# Patient Record
Sex: MALE | Race: BLACK OR AFRICAN AMERICAN | NOT HISPANIC OR LATINO | Employment: UNEMPLOYED | URBAN - NONMETROPOLITAN AREA
[De-identification: names, ages, dates, MRNs, and addresses within clinical notes are randomized per-mention and may not be internally consistent; named-entity substitution may affect disease eponyms.]

---

## 2018-08-25 ENCOUNTER — HOSPITAL ENCOUNTER (EMERGENCY)
Facility: HOSPITAL | Age: 23
Discharge: HOME | End: 2018-08-25
Attending: EMERGENCY MEDICINE | Admitting: EMERGENCY MEDICINE

## 2018-08-25 VITALS
BODY MASS INDEX: 27.32 KG/M2 | DIASTOLIC BLOOD PRESSURE: 77 MMHG | OXYGEN SATURATION: 100 % | HEIGHT: 66 IN | TEMPERATURE: 98.8 F | WEIGHT: 170 LBS | HEART RATE: 83 BPM | RESPIRATION RATE: 14 BRPM | SYSTOLIC BLOOD PRESSURE: 118 MMHG

## 2018-08-25 DIAGNOSIS — T67.1XXA HEAT SYNCOPE, INITIAL ENCOUNTER: Primary | ICD-10-CM

## 2018-08-25 LAB
ALBUMIN SERPL-MCNC: 4.5 G/DL (ref 3.5–5)
ALBUMIN/GLOB SERPL: 1.5 G/DL (ref 1.1–2.5)
ALP SERPL-CCNC: 61 U/L (ref 24–120)
ALT SERPL W P-5'-P-CCNC: 27 U/L (ref 0–54)
ANION GAP SERPL CALCULATED.3IONS-SCNC: 11 MMOL/L (ref 4–13)
AST SERPL-CCNC: 35 U/L (ref 7–45)
BASOPHILS # BLD AUTO: 0.08 10*3/MM3 (ref 0–0.2)
BASOPHILS NFR BLD AUTO: 0.7 % (ref 0–2)
BILIRUB SERPL-MCNC: 0.6 MG/DL (ref 0.1–1)
BUN BLD-MCNC: 11 MG/DL (ref 5–21)
BUN/CREAT SERPL: 15.3 (ref 7–25)
CALCIUM SPEC-SCNC: 9.3 MG/DL (ref 8.4–10.4)
CHLORIDE SERPL-SCNC: 107 MMOL/L (ref 98–110)
CO2 SERPL-SCNC: 23 MMOL/L (ref 24–31)
CREAT BLD-MCNC: 0.72 MG/DL (ref 0.5–1.4)
DEPRECATED RDW RBC AUTO: 35.4 FL (ref 40–54)
EOSINOPHIL # BLD AUTO: 0.07 10*3/MM3 (ref 0–0.7)
EOSINOPHIL NFR BLD AUTO: 0.6 % (ref 0–4)
ERYTHROCYTE [DISTWIDTH] IN BLOOD BY AUTOMATED COUNT: 11.7 % (ref 12–15)
GFR SERPL CREATININE-BSD FRML MDRD: >150 ML/MIN/1.73
GLOBULIN UR ELPH-MCNC: 3 GM/DL
GLUCOSE BLD-MCNC: 98 MG/DL (ref 70–100)
HCT VFR BLD AUTO: 46.3 % (ref 40–52)
HGB BLD-MCNC: 16.2 G/DL (ref 14–18)
HOLD SPECIMEN: NORMAL
HOLD SPECIMEN: NORMAL
IMM GRANULOCYTES # BLD: 0.05 10*3/MM3 (ref 0–0.03)
IMM GRANULOCYTES NFR BLD: 0.4 % (ref 0–5)
LYMPHOCYTES # BLD AUTO: 2.03 10*3/MM3 (ref 0.72–4.86)
LYMPHOCYTES NFR BLD AUTO: 17.5 % (ref 15–45)
MCH RBC QN AUTO: 29 PG (ref 28–32)
MCHC RBC AUTO-ENTMCNC: 35 G/DL (ref 33–36)
MCV RBC AUTO: 83 FL (ref 82–95)
MONOCYTES # BLD AUTO: 0.77 10*3/MM3 (ref 0.19–1.3)
MONOCYTES NFR BLD AUTO: 6.6 % (ref 4–12)
NEUTROPHILS # BLD AUTO: 8.62 10*3/MM3 (ref 1.87–8.4)
NEUTROPHILS NFR BLD AUTO: 74.2 % (ref 39–78)
NRBC BLD MANUAL-RTO: 0 /100 WBC (ref 0–0)
PLATELET # BLD AUTO: 288 10*3/MM3 (ref 130–400)
PMV BLD AUTO: 9.2 FL (ref 6–12)
POTASSIUM BLD-SCNC: 4.1 MMOL/L (ref 3.5–5.3)
PROT SERPL-MCNC: 7.5 G/DL (ref 6.3–8.7)
RBC # BLD AUTO: 5.58 10*6/MM3 (ref 4.8–5.9)
SODIUM BLD-SCNC: 141 MMOL/L (ref 135–145)
WBC NRBC COR # BLD: 11.62 10*3/MM3 (ref 4.8–10.8)
WHOLE BLOOD HOLD SPECIMEN: NORMAL
WHOLE BLOOD HOLD SPECIMEN: NORMAL

## 2018-08-25 PROCEDURE — 93010 ELECTROCARDIOGRAM REPORT: CPT | Performed by: INTERNAL MEDICINE

## 2018-08-25 PROCEDURE — 85025 COMPLETE CBC W/AUTO DIFF WBC: CPT | Performed by: EMERGENCY MEDICINE

## 2018-08-25 PROCEDURE — 36415 COLL VENOUS BLD VENIPUNCTURE: CPT

## 2018-08-25 PROCEDURE — 80053 COMPREHEN METABOLIC PANEL: CPT | Performed by: EMERGENCY MEDICINE

## 2018-08-25 PROCEDURE — 99284 EMERGENCY DEPT VISIT MOD MDM: CPT

## 2018-08-25 PROCEDURE — 93005 ELECTROCARDIOGRAM TRACING: CPT | Performed by: EMERGENCY MEDICINE

## 2018-08-25 RX ORDER — ACETAMINOPHEN 500 MG
1000 TABLET ORAL ONCE
Status: COMPLETED | OUTPATIENT
Start: 2018-08-25 | End: 2018-08-25

## 2018-08-25 RX ADMIN — SODIUM CHLORIDE 1000 ML: 9 INJECTION, SOLUTION INTRAVENOUS at 18:23

## 2018-08-25 RX ADMIN — ACETAMINOPHEN 1000 MG: 500 TABLET, FILM COATED ORAL at 18:25

## 2024-09-23 ENCOUNTER — APPOINTMENT (OUTPATIENT)
Dept: GENERAL RADIOLOGY | Age: 29
End: 2024-09-23
Payer: MEDICAID

## 2024-09-23 ENCOUNTER — HOSPITAL ENCOUNTER (EMERGENCY)
Age: 29
Discharge: HOME | End: 2024-09-23
Payer: MEDICAID

## 2024-09-23 VITALS
TEMPERATURE: 97.2 F | DIASTOLIC BLOOD PRESSURE: 66 MMHG | OXYGEN SATURATION: 100 % | HEART RATE: 73 BPM | RESPIRATION RATE: 13 BRPM | SYSTOLIC BLOOD PRESSURE: 112 MMHG | WEIGHT: 160 LBS

## 2024-09-23 DIAGNOSIS — F41.1 ANXIETY STATE: ICD-10-CM

## 2024-09-23 DIAGNOSIS — R07.89 OTHER CHEST PAIN: Primary | ICD-10-CM

## 2024-09-23 LAB
ALBUMIN SERPL-MCNC: 4.4 G/DL (ref 3.5–5.2)
ALP SERPL-CCNC: 61 U/L (ref 40–129)
ALT SERPL-CCNC: 13 U/L (ref 5–41)
ANION GAP SERPL CALCULATED.3IONS-SCNC: 10 MMOL/L (ref 7–19)
AST SERPL-CCNC: 19 U/L (ref 5–40)
BASOPHILS # BLD: 0.1 K/UL (ref 0–0.2)
BASOPHILS NFR BLD: 0.8 % (ref 0–1)
BILIRUB SERPL-MCNC: 0.5 MG/DL (ref 0.2–1.2)
BNP BLD-MCNC: <36 PG/ML (ref 0–124)
BUN SERPL-MCNC: 21 MG/DL (ref 6–20)
CALCIUM SERPL-MCNC: 9.1 MG/DL (ref 8.6–10)
CHLORIDE SERPL-SCNC: 102 MMOL/L (ref 98–111)
CO2 SERPL-SCNC: 26 MMOL/L (ref 22–29)
CREAT SERPL-MCNC: 0.7 MG/DL (ref 0.7–1.2)
D DIMER PPP FEU-MCNC: <0.27 UG/ML FEU (ref 0–0.48)
EOSINOPHIL # BLD: 0.2 K/UL (ref 0–0.6)
EOSINOPHIL NFR BLD: 2.3 % (ref 0–5)
ERYTHROCYTE [DISTWIDTH] IN BLOOD BY AUTOMATED COUNT: 12.7 % (ref 11.5–14.5)
GLUCOSE SERPL-MCNC: 95 MG/DL (ref 70–99)
HCT VFR BLD AUTO: 46.7 % (ref 42–52)
HGB BLD-MCNC: 15.4 G/DL (ref 14–18)
IMM GRANULOCYTES # BLD: 0 K/UL
LYMPHOCYTES # BLD: 2.8 K/UL (ref 1.1–4.5)
LYMPHOCYTES NFR BLD: 33 % (ref 20–40)
MCH RBC QN AUTO: 29.6 PG (ref 27–31)
MCHC RBC AUTO-ENTMCNC: 33 G/DL (ref 33–37)
MCV RBC AUTO: 89.8 FL (ref 80–94)
MONOCYTES # BLD: 0.8 K/UL (ref 0–0.9)
MONOCYTES NFR BLD: 8.9 % (ref 0–10)
NEUTROPHILS # BLD: 4.6 K/UL (ref 1.5–7.5)
NEUTS SEG NFR BLD: 54.9 % (ref 50–65)
PLATELET # BLD AUTO: 257 K/UL (ref 130–400)
PMV BLD AUTO: 9 FL (ref 9.4–12.4)
POTASSIUM SERPL-SCNC: 4.4 MMOL/L (ref 3.5–5)
PROT SERPL-MCNC: 7.4 G/DL (ref 6.4–8.3)
RBC # BLD AUTO: 5.2 M/UL (ref 4.7–6.1)
SODIUM SERPL-SCNC: 138 MMOL/L (ref 136–145)
TROPONIN, HIGH SENSITIVITY: <6 NG/L (ref 0–22)
WBC # BLD AUTO: 8.4 K/UL (ref 4.8–10.8)

## 2024-09-23 PROCEDURE — 93005 ELECTROCARDIOGRAM TRACING: CPT | Performed by: NURSE PRACTITIONER

## 2024-09-23 PROCEDURE — 71045 X-RAY EXAM CHEST 1 VIEW: CPT

## 2024-09-23 PROCEDURE — 85025 COMPLETE CBC W/AUTO DIFF WBC: CPT

## 2024-09-23 PROCEDURE — 36415 COLL VENOUS BLD VENIPUNCTURE: CPT

## 2024-09-23 PROCEDURE — 6370000000 HC RX 637 (ALT 250 FOR IP): Performed by: NURSE PRACTITIONER

## 2024-09-23 PROCEDURE — 84484 ASSAY OF TROPONIN QUANT: CPT

## 2024-09-23 PROCEDURE — 83880 ASSAY OF NATRIURETIC PEPTIDE: CPT

## 2024-09-23 PROCEDURE — 80053 COMPREHEN METABOLIC PANEL: CPT

## 2024-09-23 PROCEDURE — 99285 EMERGENCY DEPT VISIT HI MDM: CPT

## 2024-09-23 PROCEDURE — 85379 FIBRIN DEGRADATION QUANT: CPT

## 2024-09-23 RX ORDER — LEVETIRACETAM 500 MG/1
750 TABLET ORAL ONCE
Status: COMPLETED | OUTPATIENT
Start: 2024-09-23 | End: 2024-09-23

## 2024-09-23 RX ADMIN — LEVETIRACETAM 750 MG: 500 TABLET, FILM COATED ORAL at 19:46

## 2024-09-24 LAB
EKG P AXIS: -52 DEGREES
EKG P-R INTERVAL: 120 MS
EKG Q-T INTERVAL: 340 MS
EKG QRS DURATION: 88 MS
EKG QTC CALCULATION (BAZETT): 362 MS
EKG T AXIS: 75 DEGREES

## 2024-09-27 ENCOUNTER — APPOINTMENT (OUTPATIENT)
Dept: CT IMAGING | Facility: HOSPITAL | Age: 29
End: 2024-09-27
Payer: MEDICAID

## 2024-09-27 ENCOUNTER — APPOINTMENT (OUTPATIENT)
Dept: GENERAL RADIOLOGY | Facility: HOSPITAL | Age: 29
End: 2024-09-27
Payer: MEDICAID

## 2024-09-27 ENCOUNTER — HOSPITAL ENCOUNTER (EMERGENCY)
Facility: HOSPITAL | Age: 29
Discharge: HOME | End: 2024-09-27
Attending: EMERGENCY MEDICINE
Payer: MEDICAID

## 2024-09-27 VITALS
WEIGHT: 190 LBS | OXYGEN SATURATION: 99 % | HEART RATE: 94 BPM | RESPIRATION RATE: 20 BRPM | HEIGHT: 72 IN | DIASTOLIC BLOOD PRESSURE: 78 MMHG | SYSTOLIC BLOOD PRESSURE: 135 MMHG | TEMPERATURE: 97.9 F | BODY MASS INDEX: 25.73 KG/M2

## 2024-09-27 DIAGNOSIS — R56.9 SEIZURE: Primary | ICD-10-CM

## 2024-09-27 DIAGNOSIS — Z91.199 NONCOMPLIANCE: ICD-10-CM

## 2024-09-27 LAB
ALBUMIN SERPL-MCNC: 4.4 G/DL (ref 3.5–5.2)
ALBUMIN/GLOB SERPL: 1.6 G/DL
ALP SERPL-CCNC: 68 U/L (ref 39–117)
ALT SERPL W P-5'-P-CCNC: 15 U/L (ref 1–41)
AMPHET+METHAMPHET UR QL: NEGATIVE
AMPHETAMINES UR QL: NEGATIVE
ANION GAP SERPL CALCULATED.3IONS-SCNC: 10 MMOL/L (ref 5–15)
AST SERPL-CCNC: 20 U/L (ref 1–40)
BARBITURATES UR QL SCN: NEGATIVE
BASOPHILS # BLD AUTO: 0.05 10*3/MM3 (ref 0–0.2)
BASOPHILS NFR BLD AUTO: 0.7 % (ref 0–1.5)
BENZODIAZ UR QL SCN: NEGATIVE
BILIRUB SERPL-MCNC: 0.2 MG/DL (ref 0–1.2)
BILIRUB UR QL STRIP: NEGATIVE
BUN SERPL-MCNC: 17 MG/DL (ref 6–20)
BUN/CREAT SERPL: 23.9 (ref 7–25)
BUPRENORPHINE SERPL-MCNC: NEGATIVE NG/ML
CALCIUM SPEC-SCNC: 9.5 MG/DL (ref 8.6–10.5)
CANNABINOIDS SERPL QL: NEGATIVE
CHLORIDE SERPL-SCNC: 103 MMOL/L (ref 98–107)
CLARITY UR: ABNORMAL
CO2 SERPL-SCNC: 29 MMOL/L (ref 22–29)
COCAINE UR QL: NEGATIVE
COLOR UR: YELLOW
CREAT SERPL-MCNC: 0.71 MG/DL (ref 0.76–1.27)
DEPRECATED RDW RBC AUTO: 41.8 FL (ref 37–54)
EGFRCR SERPLBLD CKD-EPI 2021: 128.2 ML/MIN/1.73
EOSINOPHIL # BLD AUTO: 0.17 10*3/MM3 (ref 0–0.4)
EOSINOPHIL NFR BLD AUTO: 2.3 % (ref 0.3–6.2)
ERYTHROCYTE [DISTWIDTH] IN BLOOD BY AUTOMATED COUNT: 12.9 % (ref 12.3–15.4)
FENTANYL UR-MCNC: NEGATIVE NG/ML
GLOBULIN UR ELPH-MCNC: 2.7 GM/DL
GLUCOSE SERPL-MCNC: 87 MG/DL (ref 65–99)
GLUCOSE UR STRIP-MCNC: NEGATIVE MG/DL
HCT VFR BLD AUTO: 43.9 % (ref 37.5–51)
HGB BLD-MCNC: 14.9 G/DL (ref 13–17.7)
HGB UR QL STRIP.AUTO: NEGATIVE
IMM GRANULOCYTES # BLD AUTO: 0.01 10*3/MM3 (ref 0–0.05)
IMM GRANULOCYTES NFR BLD AUTO: 0.1 % (ref 0–0.5)
KETONES UR QL STRIP: NEGATIVE
LEUKOCYTE ESTERASE UR QL STRIP.AUTO: NEGATIVE
LYMPHOCYTES # BLD AUTO: 2.5 10*3/MM3 (ref 0.7–3.1)
LYMPHOCYTES NFR BLD AUTO: 34.5 % (ref 19.6–45.3)
MCH RBC QN AUTO: 30.3 PG (ref 26.6–33)
MCHC RBC AUTO-ENTMCNC: 33.9 G/DL (ref 31.5–35.7)
MCV RBC AUTO: 89.4 FL (ref 79–97)
METHADONE UR QL SCN: NEGATIVE
MONOCYTES # BLD AUTO: 0.59 10*3/MM3 (ref 0.1–0.9)
MONOCYTES NFR BLD AUTO: 8.1 % (ref 5–12)
NEUTROPHILS NFR BLD AUTO: 3.92 10*3/MM3 (ref 1.7–7)
NEUTROPHILS NFR BLD AUTO: 54.3 % (ref 42.7–76)
NITRITE UR QL STRIP: NEGATIVE
NRBC BLD AUTO-RTO: 0 /100 WBC (ref 0–0.2)
OPIATES UR QL: NEGATIVE
OXYCODONE UR QL SCN: NEGATIVE
PCP UR QL SCN: NEGATIVE
PH UR STRIP.AUTO: 8.5 [PH] (ref 5–8)
PLATELET # BLD AUTO: 255 10*3/MM3 (ref 140–450)
PMV BLD AUTO: 9.1 FL (ref 6–12)
POTASSIUM SERPL-SCNC: 4.7 MMOL/L (ref 3.5–5.2)
PROT SERPL-MCNC: 7.1 G/DL (ref 6–8.5)
PROT UR QL STRIP: NEGATIVE
RBC # BLD AUTO: 4.91 10*6/MM3 (ref 4.14–5.8)
SODIUM SERPL-SCNC: 142 MMOL/L (ref 136–145)
SP GR UR STRIP: 1.02 (ref 1–1.03)
TRICYCLICS UR QL SCN: NEGATIVE
UROBILINOGEN UR QL STRIP: ABNORMAL
WBC NRBC COR # BLD AUTO: 7.24 10*3/MM3 (ref 3.4–10.8)

## 2024-09-27 PROCEDURE — 25810000003 SODIUM CHLORIDE 0.9 % SOLUTION: Performed by: EMERGENCY MEDICINE

## 2024-09-27 PROCEDURE — 25010000002 LEVETRIRACETAM PER 10 MG: Performed by: EMERGENCY MEDICINE

## 2024-09-27 PROCEDURE — 71045 X-RAY EXAM CHEST 1 VIEW: CPT

## 2024-09-27 PROCEDURE — 85025 COMPLETE CBC W/AUTO DIFF WBC: CPT | Performed by: EMERGENCY MEDICINE

## 2024-09-27 PROCEDURE — 99284 EMERGENCY DEPT VISIT MOD MDM: CPT

## 2024-09-27 PROCEDURE — 93010 ELECTROCARDIOGRAM REPORT: CPT | Performed by: INTERNAL MEDICINE

## 2024-09-27 PROCEDURE — 81003 URINALYSIS AUTO W/O SCOPE: CPT | Performed by: EMERGENCY MEDICINE

## 2024-09-27 PROCEDURE — 70450 CT HEAD/BRAIN W/O DYE: CPT

## 2024-09-27 PROCEDURE — 72125 CT NECK SPINE W/O DYE: CPT

## 2024-09-27 PROCEDURE — 80053 COMPREHEN METABOLIC PANEL: CPT | Performed by: EMERGENCY MEDICINE

## 2024-09-27 PROCEDURE — 80307 DRUG TEST PRSMV CHEM ANLYZR: CPT | Performed by: EMERGENCY MEDICINE

## 2024-09-27 PROCEDURE — 93005 ELECTROCARDIOGRAM TRACING: CPT | Performed by: EMERGENCY MEDICINE

## 2024-09-27 PROCEDURE — 96374 THER/PROPH/DIAG INJ IV PUSH: CPT

## 2024-09-27 RX ORDER — LEVETIRACETAM 750 MG/1
750 TABLET ORAL EVERY 12 HOURS SCHEDULED
COMMUNITY
Start: 2024-09-23

## 2024-09-27 RX ORDER — IBUPROFEN 800 MG/1
TABLET, FILM COATED ORAL
Status: ON HOLD | COMMUNITY
Start: 2024-09-27 | End: 2024-09-28

## 2024-09-27 RX ORDER — AMMONIUM LACTATE 12 G/100G
LOTION TOPICAL
Status: ON HOLD | COMMUNITY
Start: 2024-09-27 | End: 2024-09-28

## 2024-09-27 RX ORDER — LEVETIRACETAM 500 MG/1
750 TABLET ORAL 2 TIMES DAILY
Qty: 90 TABLET | Refills: 0 | Status: ON HOLD | OUTPATIENT
Start: 2024-09-27 | End: 2024-09-28

## 2024-09-27 RX ORDER — SODIUM CHLORIDE 0.9 % (FLUSH) 0.9 %
10 SYRINGE (ML) INJECTION AS NEEDED
Status: DISCONTINUED | OUTPATIENT
Start: 2024-09-27 | End: 2024-09-27 | Stop reason: HOSPADM

## 2024-09-27 RX ORDER — LEVETIRACETAM 500 MG/5ML
1000 INJECTION, SOLUTION, CONCENTRATE INTRAVENOUS ONCE
Status: COMPLETED | OUTPATIENT
Start: 2024-09-27 | End: 2024-09-27

## 2024-09-27 RX ORDER — BUSPIRONE HYDROCHLORIDE 5 MG/1
TABLET ORAL
Status: ON HOLD | COMMUNITY
Start: 2024-09-25 | End: 2024-09-28

## 2024-09-27 RX ORDER — LEVETIRACETAM 500 MG/1
500 TABLET ORAL 2 TIMES DAILY
Qty: 60 TABLET | Refills: 0 | Status: SHIPPED | OUTPATIENT
Start: 2024-09-27 | End: 2024-09-27

## 2024-09-27 RX ADMIN — SODIUM CHLORIDE 500 ML: 9 INJECTION, SOLUTION INTRAVENOUS at 17:43

## 2024-09-27 RX ADMIN — LEVETIRACETAM 1000 MG: 100 INJECTION INTRAVENOUS at 17:25

## 2024-09-27 NOTE — DISCHARGE INSTRUCTIONS
It was very nice to meet you, Get. Thank you for allowing us to take care of you today at [facility].     Today you were seen in the emergency department for your symptoms. Please understand that an ER evaluation is just the start of your evaluation. We do the best we can, but we are often unable to fully find what is causing your symptoms from one evaluation.  Because of this, the goal is to determine whether you need to be evaluated in the hospital or if it is safe for you to go home and see other doctors provided such as primary care physicians or specialist on an outpatient basis.      Like we discussed, I strongly urge that you follow up with your primary care doctor. Please call their office to set up an appointment as soon as possible so that you can be re-evaluated for improvement in your symptoms or for any other questions.  I have provided the information needed, including phone number, to call to set up an appointment below in these discharge papers.      Educational material has also been provided in the following pages regarding what we have discussed today.      Please return to the emergency room within 12-48 hours if you experience symptoms such as the following:   Fever, chills, chest pain or shortness of breath, pain with inspiration/expiration, pain that travels to your arms, neck or back, nausea, vomiting, severe headache, tearing pain in your chest, dizziness, feel as though you are about to pass out, OR if you have any worsening symptoms, or any other concerns.      No driving or operating machinery until released.  No climbing ladders, work at heights or swimming until released.       Recommended to observe all seizure precautions, including, but not limited to   No driving until seizure free for more than 3 months- as per State driving regulation / law;   Avoid all high-risk activity,   Take showers instead of baths,   Avoid swimming without observation,   Avoid open heat  sources,   Avoid working at heights and   Avoid engaging in all potentially hazardous activities.

## 2024-09-27 NOTE — ED PROVIDER NOTES
Subjective   History of Present Illness    Review of Systems    Past Medical History:   Diagnosis Date    Hyperlipidemia     Seizures        No Known Allergies    Past Surgical History:   Procedure Laterality Date    NO PAST SURGERIES         History reviewed. No pertinent family history.    Social History     Socioeconomic History    Marital status: Single   Tobacco Use    Smoking status: Heavy Smoker     Current packs/day: 2.00     Types: Cigarettes   Substance and Sexual Activity    Alcohol use: No    Drug use: No           Objective   Physical Exam    Procedures           ED Course  ED Course as of 09/27/24 1839   Fri Sep 27, 2024   1734 Taken over from Dr. Starr pending lab reports [TS]   1836 Please refer to Dr. Starr reports further details the patient came in after having a seizure he has been off of seizure medications.  CT of the head and CT of the cervical spine and chest x-ray are negative. [TS]   1836 Examination the patient at this time reviewed the patient's Jossie Coma Scale of 15 of 15.  Scalp is atraumatic.  Pupils are PERRL.  Neck is supple.  Chest auscultation bilateral good breath sounds.  Abdomen soft nontender.  CNS times nonfocal cranial suture within normal limits of motor system examination unremarkable.  In the ED patient was given Keppra and will discharge him home on Keppra and have him follow the primary MD seizure precautions. [TS]   1839 Patient has not been taking his antiepileptics he states he does not have any therefore repeat prescription was called in for him. [TS]      ED Course User Index  [TS] Hesham Cohen MD                                             Medical Decision Making  Problems Addressed:  Noncompliance: complicated acute illness or injury  Seizure: complicated acute illness or injury    Amount and/or Complexity of Data Reviewed  Labs: ordered.  Radiology: ordered.  ECG/medicine tests: ordered.    Risk  Prescription drug management.        Final diagnoses:    Seizure   Noncompliance       ED Disposition  ED Disposition       ED Disposition   Discharge    Condition   Stable    Comment   --               Vianey Nancy N, APRN  [address] [facility] [facility] [address]  [address]  [facility] [address]  [phone]               Medication List        Changed      * levETIRAcetam 750 MG tablet  Commonly known as: KEPPRA  What changed: Another medication with the same name was added. Make sure you understand how and when to take each.     * levETIRAcetam 500 MG tablet  Commonly known as: KEPPRA  Take 1.5 tablets by mouth 2 (Two) Times a Day for 30 days.  What changed: You were already taking a medication with the same name, and this prescription was added. Make sure you understand how and when to take each.           * This list has 2 medication(s) that are the same as other medications prescribed for you. Read the directions carefully, and ask your doctor or other care provider to review them with you.                   Where to Get Your Medications        These medications were sent to [id number] - [place], [place] - [address] [place] [address] [phone] [facility] [phone] [address] [address]      Phone: [phone]   levETIRAcetam 500 MG tablet            Hesham Cohen MD  09/27/24 2179

## 2024-09-27 NOTE — ED PROVIDER NOTES
Subjective   History of Present Illness  Patient is a 29-year-old male with a history of epilepsy who presents to the ER status post seizure.  Patient is currently at [employer] and has been sober for the last 7 months.  Patient states he has not had his Keppra in the last 2 weeks.  Today the patient was found lying in the floor with tonic-clonic like movement.  They state he had intermittent shaking for approximately 15 minutes.  EMS was called and patient was found to be postictal.  Patient has improved in route.  Patient complains of a headache and chest pain.  He denies any fever, shortness of air, abdominal pain, nausea vomiting diarrhea, urinary changes, numbness or weakness, neck or back pain, extremity pain.      Review of Systems   Constitutional: Negative.    Eyes: Negative.    Respiratory: Negative.     Cardiovascular:  Positive for chest pain.   Gastrointestinal: Negative.    Endocrine: Negative.    Genitourinary: Negative.    Musculoskeletal: Negative.    Skin: Negative.    Allergic/Immunologic: Negative.    Neurological:  Positive for seizures and headaches.   Hematological: Negative.    Psychiatric/Behavioral: Negative.     All other systems reviewed and are negative.      Past Medical History:   Diagnosis Date    Hyperlipidemia     Seizures        No Known Allergies    Past Surgical History:   Procedure Laterality Date    NO PAST SURGERIES         History reviewed. No pertinent family history.    Social History     Socioeconomic History    Marital status: Single   Tobacco Use    Smoking status: Heavy Smoker     Current packs/day: 1.00     Types: Cigarettes   Vaping Use    Vaping status: Every Day    Substances: Nicotine    Passive vaping exposure: Yes   Substance and Sexual Activity    Alcohol use: No    Drug use: Not Currently     Types: Methamphetamines    Sexual activity: Defer           Objective   Physical Exam  Vitals and nursing note reviewed.   Constitutional:       Appearance: He is  well-developed.   HENT:      Head: Normocephalic and atraumatic.   Eyes:      Conjunctiva/sclera: Conjunctivae normal.      Pupils: Pupils are equal, round, and reactive to light.   Cardiovascular:      Rate and Rhythm: Normal rate and regular rhythm.      Heart sounds: Normal heart sounds.   Pulmonary:      Effort: Pulmonary effort is normal.      Breath sounds: Normal breath sounds.   Chest:      Comments: Tender to palpation midsternal chest wall  Abdominal:      Palpations: Abdomen is soft.      Tenderness: There is no abdominal tenderness.   Musculoskeletal:         General: No deformity. Normal range of motion.      Cervical back: Normal range of motion.   Skin:     General: Skin is warm.      Comments: Blister to the left heel   Neurological:      Mental Status: He is alert and oriented to person, place, and time.      Cranial Nerves: Cranial nerves 2-12 are intact.      Sensory: Sensation is intact.      Motor: Motor function is intact.   Psychiatric:         Behavior: Behavior normal.         Procedures           ED Course  ED Course as of 09/30/24 0644   Fri Sep 27, 2024   1734 Taken over from Dr. Starr pending lab reports [TS]   1836 Please refer to Dr. Starr reports further details the patient came in after having a seizure he has been off of seizure medications.  CT of the head and CT of the cervical spine and chest x-ray are negative. [TS]   1836 Examination the patient at this time reviewed the patient's Jossie Coma Scale of 15 of 15.  Scalp is atraumatic.  Pupils are PERRL.  Neck is supple.  Chest auscultation bilateral good breath sounds.  Abdomen soft nontender.  CNS times nonfocal cranial suture within normal limits of motor system examination unremarkable.  In the ED patient was given Keppra and will discharge him home on Keppra and have him follow the primary MD seizure precautions. [TS]   1839 Patient has not been taking his antiepileptics he states he does not have any therefore repeat  prescription was called in for him. [TS]      ED Course User Index  [TS] Hesham Cohen MD      Lab Results (last 24 hours)       ** No results found for the last 24 hours. **           XR Chest 1 View   Final Result   Impression: No evidence of acute cardiopulmonary disease.       This report was signed and finalized on 9/27/2024 5:59 PM by Dr. Mitch Leger MD.          CT Head Without Contrast   Final Result   1. No acute intracranial process.               This report was signed and finalized on 9/27/2024 5:55 PM by Dr. Mitch Leger MD.          CT Cervical Spine Without Contrast   Final Result   1. No evidence of acute osseous injury or malalignment in the cervical   spine.               This report was signed and finalized on 9/27/2024 5:59 PM by Dr. Mitch Leger MD.                                                  Medical Decision Making  Patient is a 29-year-old male with a history of epilepsy who presents to the ER status post seizure.  Patient is currently at [employer] and has been sober for the last 7 months.  Patient states he has not had his Keppra in the last 2 weeks.  Today the patient was found lying in the floor with tonic-clonic like movement.  They state he had intermittent shaking for approximately 15 minutes.  EMS was called and patient was found to be postictal.  Patient has improved in route.  Patient complains of a headache and chest pain.  He denies any fever, shortness of air, abdominal pain, nausea vomiting diarrhea, urinary changes, numbness or weakness, neck or back pain, extremity pain.    Differential diagnosis: Seizure, syncope    Patient was given IV fluids and loaded with Keppra.  Labs and imaging were ordered and results pending at shift change.  Care transferred to Dr. Cohen.    Problems Addressed:  Noncompliance: complicated acute illness or injury  Seizure: complicated acute illness or injury    Amount and/or Complexity of Data Reviewed  Labs:  ordered.  Radiology: ordered.  ECG/medicine tests: ordered.    Risk  Prescription drug management.        Final diagnoses:   Seizure   Noncompliance       ED Disposition  ED Disposition       ED Disposition   Discharge    Condition   Stable    Comment   --               Nancy Winters, APRN  [address] [facility] [address]  [address]  [facility] [address]  [phone]               Medication List      No changes were made to your prescriptions during this visit.            Ramona Starr MD  09/27/24 1722       Ramona Starr MD  09/30/24 0637

## 2024-09-28 ENCOUNTER — APPOINTMENT (OUTPATIENT)
Dept: CT IMAGING | Facility: HOSPITAL | Age: 29
End: 2024-09-28
Payer: MEDICAID

## 2024-09-28 ENCOUNTER — APPOINTMENT (OUTPATIENT)
Dept: GENERAL RADIOLOGY | Facility: HOSPITAL | Age: 29
End: 2024-09-28
Payer: MEDICAID

## 2024-09-28 ENCOUNTER — APPOINTMENT (OUTPATIENT)
Dept: MRI IMAGING | Facility: HOSPITAL | Age: 29
End: 2024-09-28
Payer: MEDICAID

## 2024-09-28 ENCOUNTER — APPOINTMENT (OUTPATIENT)
Dept: NEUROLOGY | Facility: HOSPITAL | Age: 29
End: 2024-09-28
Payer: MEDICAID

## 2024-09-28 ENCOUNTER — HOSPITAL ENCOUNTER (OUTPATIENT)
Facility: HOSPITAL | Age: 29
Setting detail: OBSERVATION
Discharge: HOME | End: 2024-09-29
Attending: EMERGENCY MEDICINE | Admitting: INTERNAL MEDICINE
Payer: MEDICAID

## 2024-09-28 DIAGNOSIS — G40.909 SEIZURE DISORDER: Primary | ICD-10-CM

## 2024-09-28 PROBLEM — F19.11 HISTORY OF DRUG ABUSE: Status: ACTIVE | Noted: 2024-09-28

## 2024-09-28 PROBLEM — Z72.0 TOBACCO ABUSE: Status: ACTIVE | Noted: 2024-09-28

## 2024-09-28 PROBLEM — Z91.148 NONCOMPLIANCE WITH MEDICATION REGIMEN: Status: ACTIVE | Noted: 2024-09-28

## 2024-09-28 PROBLEM — R56.9 SEIZURE: Status: ACTIVE | Noted: 2024-09-28

## 2024-09-28 LAB
ALBUMIN SERPL-MCNC: 4 G/DL (ref 3.5–5.2)
ALBUMIN/GLOB SERPL: 1.6 G/DL
ALP SERPL-CCNC: 58 U/L (ref 39–117)
ALT SERPL W P-5'-P-CCNC: 15 U/L (ref 1–41)
AMPHET+METHAMPHET UR QL: NEGATIVE
AMPHETAMINES UR QL: NEGATIVE
ANION GAP SERPL CALCULATED.3IONS-SCNC: 8 MMOL/L (ref 5–15)
AST SERPL-CCNC: 20 U/L (ref 1–40)
BARBITURATES UR QL SCN: NEGATIVE
BASOPHILS # BLD AUTO: 0.03 10*3/MM3 (ref 0–0.2)
BASOPHILS NFR BLD AUTO: 0.4 % (ref 0–1.5)
BENZODIAZ UR QL SCN: NEGATIVE
BILIRUB SERPL-MCNC: 0.3 MG/DL (ref 0–1.2)
BUN SERPL-MCNC: 14 MG/DL (ref 6–20)
BUN/CREAT SERPL: 19.7 (ref 7–25)
BUPRENORPHINE SERPL-MCNC: NEGATIVE NG/ML
CALCIUM SPEC-SCNC: 8.8 MG/DL (ref 8.6–10.5)
CANNABINOIDS SERPL QL: NEGATIVE
CHLORIDE SERPL-SCNC: 107 MMOL/L (ref 98–107)
CK SERPL-CCNC: 85 U/L (ref 20–200)
CO2 SERPL-SCNC: 28 MMOL/L (ref 22–29)
COCAINE UR QL: NEGATIVE
CREAT SERPL-MCNC: 0.71 MG/DL (ref 0.76–1.27)
D-LACTATE SERPL-SCNC: 1.8 MMOL/L (ref 0.5–2)
DEPRECATED RDW RBC AUTO: 42.6 FL (ref 37–54)
EGFRCR SERPLBLD CKD-EPI 2021: 128.2 ML/MIN/1.73
EOSINOPHIL # BLD AUTO: 0.13 10*3/MM3 (ref 0–0.4)
EOSINOPHIL NFR BLD AUTO: 1.9 % (ref 0.3–6.2)
ERYTHROCYTE [DISTWIDTH] IN BLOOD BY AUTOMATED COUNT: 13 % (ref 12.3–15.4)
ETHANOL UR QL: <0.01 %
FENTANYL UR-MCNC: NEGATIVE NG/ML
GLOBULIN UR ELPH-MCNC: 2.5 GM/DL
GLUCOSE SERPL-MCNC: 99 MG/DL (ref 65–99)
HCT VFR BLD AUTO: 40.7 % (ref 37.5–51)
HGB BLD-MCNC: 13.5 G/DL (ref 13–17.7)
IMM GRANULOCYTES # BLD AUTO: 0.02 10*3/MM3 (ref 0–0.05)
IMM GRANULOCYTES NFR BLD AUTO: 0.3 % (ref 0–0.5)
LYMPHOCYTES # BLD AUTO: 2.1 10*3/MM3 (ref 0.7–3.1)
LYMPHOCYTES NFR BLD AUTO: 30.2 % (ref 19.6–45.3)
MAGNESIUM SERPL-MCNC: 1.9 MG/DL (ref 1.6–2.6)
MCH RBC QN AUTO: 29.9 PG (ref 26.6–33)
MCHC RBC AUTO-ENTMCNC: 33.2 G/DL (ref 31.5–35.7)
MCV RBC AUTO: 90 FL (ref 79–97)
METHADONE UR QL SCN: NEGATIVE
MONOCYTES # BLD AUTO: 0.58 10*3/MM3 (ref 0.1–0.9)
MONOCYTES NFR BLD AUTO: 8.3 % (ref 5–12)
NEUTROPHILS NFR BLD AUTO: 4.1 10*3/MM3 (ref 1.7–7)
NEUTROPHILS NFR BLD AUTO: 58.9 % (ref 42.7–76)
NRBC BLD AUTO-RTO: 0 /100 WBC (ref 0–0.2)
OPIATES UR QL: NEGATIVE
OXYCODONE UR QL SCN: NEGATIVE
PCP UR QL SCN: NEGATIVE
PLATELET # BLD AUTO: 232 10*3/MM3 (ref 140–450)
PMV BLD AUTO: 9.3 FL (ref 6–12)
POTASSIUM SERPL-SCNC: 4.5 MMOL/L (ref 3.5–5.2)
PROT SERPL-MCNC: 6.5 G/DL (ref 6–8.5)
QT INTERVAL: 322 MS
QT INTERVAL: 326 MS
QTC INTERVAL: 385 MS
QTC INTERVAL: 393 MS
RBC # BLD AUTO: 4.52 10*6/MM3 (ref 4.14–5.8)
SODIUM SERPL-SCNC: 143 MMOL/L (ref 136–145)
TRICYCLICS UR QL SCN: NEGATIVE
WBC NRBC COR # BLD AUTO: 6.96 10*3/MM3 (ref 3.4–10.8)

## 2024-09-28 PROCEDURE — 80307 DRUG TEST PRSMV CHEM ANLYZR: CPT | Performed by: EMERGENCY MEDICINE

## 2024-09-28 PROCEDURE — 85025 COMPLETE CBC W/AUTO DIFF WBC: CPT | Performed by: EMERGENCY MEDICINE

## 2024-09-28 PROCEDURE — G0378 HOSPITAL OBSERVATION PER HR: HCPCS

## 2024-09-28 PROCEDURE — 93005 ELECTROCARDIOGRAM TRACING: CPT | Performed by: EMERGENCY MEDICINE

## 2024-09-28 PROCEDURE — 70553 MRI BRAIN STEM W/O & W/DYE: CPT

## 2024-09-28 PROCEDURE — 70450 CT HEAD/BRAIN W/O DYE: CPT

## 2024-09-28 PROCEDURE — 83605 ASSAY OF LACTIC ACID: CPT | Performed by: EMERGENCY MEDICINE

## 2024-09-28 PROCEDURE — 80053 COMPREHEN METABOLIC PANEL: CPT | Performed by: EMERGENCY MEDICINE

## 2024-09-28 PROCEDURE — 0 GADOBENATE DIMEGLUMINE 529 MG/ML SOLUTION: Performed by: INTERNAL MEDICINE

## 2024-09-28 PROCEDURE — 95819 EEG AWAKE AND ASLEEP: CPT | Performed by: PSYCHIATRY & NEUROLOGY

## 2024-09-28 PROCEDURE — 99285 EMERGENCY DEPT VISIT HI MDM: CPT

## 2024-09-28 PROCEDURE — 72125 CT NECK SPINE W/O DYE: CPT

## 2024-09-28 PROCEDURE — 71045 X-RAY EXAM CHEST 1 VIEW: CPT

## 2024-09-28 PROCEDURE — 72131 CT LUMBAR SPINE W/O DYE: CPT

## 2024-09-28 PROCEDURE — A9577 INJ MULTIHANCE: HCPCS | Performed by: INTERNAL MEDICINE

## 2024-09-28 PROCEDURE — 83735 ASSAY OF MAGNESIUM: CPT | Performed by: CLINICAL NURSE SPECIALIST

## 2024-09-28 PROCEDURE — 80177 DRUG SCRN QUAN LEVETIRACETAM: CPT | Performed by: EMERGENCY MEDICINE

## 2024-09-28 PROCEDURE — 82550 ASSAY OF CK (CPK): CPT | Performed by: EMERGENCY MEDICINE

## 2024-09-28 PROCEDURE — 82077 ASSAY SPEC XCP UR&BREATH IA: CPT | Performed by: EMERGENCY MEDICINE

## 2024-09-28 PROCEDURE — 95819 EEG AWAKE AND ASLEEP: CPT

## 2024-09-28 PROCEDURE — 99214 OFFICE O/P EST MOD 30 MIN: CPT | Performed by: CLINICAL NURSE SPECIALIST

## 2024-09-28 PROCEDURE — 96374 THER/PROPH/DIAG INJ IV PUSH: CPT

## 2024-09-28 PROCEDURE — 25010000002 LEVETRIRACETAM PER 10 MG: Performed by: EMERGENCY MEDICINE

## 2024-09-28 RX ORDER — SODIUM CHLORIDE 9 MG/ML
40 INJECTION, SOLUTION INTRAVENOUS AS NEEDED
Status: DISCONTINUED | OUTPATIENT
Start: 2024-09-28 | End: 2024-09-29 | Stop reason: HOSPADM

## 2024-09-28 RX ORDER — ONDANSETRON 4 MG/1
4 TABLET, ORALLY DISINTEGRATING ORAL EVERY 6 HOURS PRN
Status: DISCONTINUED | OUTPATIENT
Start: 2024-09-28 | End: 2024-09-29 | Stop reason: HOSPADM

## 2024-09-28 RX ORDER — BISACODYL 5 MG/1
5 TABLET, DELAYED RELEASE ORAL DAILY PRN
Status: DISCONTINUED | OUTPATIENT
Start: 2024-09-28 | End: 2024-09-29 | Stop reason: HOSPADM

## 2024-09-28 RX ORDER — SODIUM CHLORIDE 0.9 % (FLUSH) 0.9 %
10 SYRINGE (ML) INJECTION AS NEEDED
Status: DISCONTINUED | OUTPATIENT
Start: 2024-09-28 | End: 2024-09-29 | Stop reason: HOSPADM

## 2024-09-28 RX ORDER — METAXALONE 800 MG/1
800 TABLET ORAL 3 TIMES DAILY PRN
Status: DISCONTINUED | OUTPATIENT
Start: 2024-09-28 | End: 2024-09-29 | Stop reason: HOSPADM

## 2024-09-28 RX ORDER — IBUPROFEN 800 MG/1
800 TABLET, FILM COATED ORAL EVERY 8 HOURS PRN
COMMUNITY

## 2024-09-28 RX ORDER — LUMATEPERONE 10.5 MG/1
1 CAPSULE ORAL DAILY
COMMUNITY

## 2024-09-28 RX ORDER — BUSPIRONE HYDROCHLORIDE 5 MG/1
5 TABLET ORAL EVERY 8 HOURS SCHEDULED
Status: DISCONTINUED | OUTPATIENT
Start: 2024-09-28 | End: 2024-09-29 | Stop reason: HOSPADM

## 2024-09-28 RX ORDER — BUSPIRONE HYDROCHLORIDE 5 MG/1
5 TABLET ORAL 3 TIMES DAILY
COMMUNITY

## 2024-09-28 RX ORDER — ONDANSETRON 2 MG/ML
4 INJECTION INTRAMUSCULAR; INTRAVENOUS EVERY 6 HOURS PRN
Status: DISCONTINUED | OUTPATIENT
Start: 2024-09-28 | End: 2024-09-29 | Stop reason: HOSPADM

## 2024-09-28 RX ORDER — LEVETIRACETAM 500 MG/5ML
1500 INJECTION, SOLUTION, CONCENTRATE INTRAVENOUS ONCE
Status: COMPLETED | OUTPATIENT
Start: 2024-09-28 | End: 2024-09-28

## 2024-09-28 RX ORDER — SODIUM CHLORIDE 0.9 % (FLUSH) 0.9 %
10 SYRINGE (ML) INJECTION EVERY 12 HOURS SCHEDULED
Status: DISCONTINUED | OUTPATIENT
Start: 2024-09-28 | End: 2024-09-29 | Stop reason: HOSPADM

## 2024-09-28 RX ORDER — POLYETHYLENE GLYCOL 3350 17 G/17G
17 POWDER, FOR SOLUTION ORAL DAILY PRN
Status: DISCONTINUED | OUTPATIENT
Start: 2024-09-28 | End: 2024-09-29 | Stop reason: HOSPADM

## 2024-09-28 RX ORDER — BISACODYL 10 MG
10 SUPPOSITORY, RECTAL RECTAL DAILY PRN
Status: DISCONTINUED | OUTPATIENT
Start: 2024-09-28 | End: 2024-09-29 | Stop reason: HOSPADM

## 2024-09-28 RX ORDER — LORAZEPAM 2 MG/ML
1 INJECTION INTRAMUSCULAR
Status: DISCONTINUED | OUTPATIENT
Start: 2024-09-28 | End: 2024-09-29 | Stop reason: HOSPADM

## 2024-09-28 RX ORDER — AMOXICILLIN 250 MG
2 CAPSULE ORAL 2 TIMES DAILY PRN
Status: DISCONTINUED | OUTPATIENT
Start: 2024-09-28 | End: 2024-09-29 | Stop reason: HOSPADM

## 2024-09-28 RX ADMIN — LEVETIRACETAM 1500 MG: 100 INJECTION, SOLUTION INTRAVENOUS at 12:02

## 2024-09-28 RX ADMIN — GADOBENATE DIMEGLUMINE 17 ML: 529 INJECTION, SOLUTION INTRAVENOUS at 15:26

## 2024-09-28 RX ADMIN — BUSPIRONE HYDROCHLORIDE 5 MG: 5 TABLET ORAL at 21:43

## 2024-09-28 RX ADMIN — Medication 10 ML: at 20:06

## 2024-09-28 RX ADMIN — BUSPIRONE HYDROCHLORIDE 5 MG: 5 TABLET ORAL at 17:08

## 2024-09-28 RX ADMIN — LEVETIRACETAM 750 MG: 500 TABLET, FILM COATED ORAL at 20:06

## 2024-09-28 RX ADMIN — METAXALONE 800 MG: 800 TABLET ORAL at 18:12

## 2024-09-28 NOTE — H&P
[facility] Services  HISTORY AND PHYSICAL    Date of Admission: 9/28/2024  Primary Care Physician: Nancy Winetrs APRN    Subjective   Primary Historian: Patient    Chief Complaint: Seizure    History of Present Illness  Hiren is a 29-year-old male with past medical history of hyperlipidemia, seizures, tobacco abuse, history of drug abuse and medical noncompliance.  Patient presented today with a recurrence of seizure activity.  Patient was seen yesterday in the ED for noncompliance with Keppra and seizure activity.  CT of the head and CT of the cervical spine and chest x-ray were negative.  Patient's Kite Coma Scale was 15 of 15, CNS and motor systems examination was unremarkable.  Patient was given Keppra and discharged home with prescription to have him follow-up with PCP and seizure precautions.  Patient presented again today with similar activity he said he was in the bathroom and had a seizure after taking his nighttime Keppra.  He fell in the bathroom and hit his head on the floor and his was complaining of some neck and lower back pain. CT of the head, lumbar and cervical spine pending.  He presented in a postictal state was difficult to ascertain much history however patient is tender in the lumbar spine, he was placed in a cervical collar pending labs and Keppra level.  Case was discussed with neurology per Dr. Cohen, who recommended overnight evaluation, EEG and MRI.  Patient will be admitted for overnight evaluation and treatment.    CK within normal limits, creatinine 0.71 lactate, CBC, UDS and urinalysis unremarkable  CRX no acute findings    Seizure precautions in place to include seizure padding to all rails.    Review of Systems   Respiratory:  Negative for shortness of breath.    Cardiovascular:  Negative for chest pain.   Gastrointestinal:  Negative for nausea and vomiting.   Musculoskeletal:  Positive for neck pain.   Neurological:  Positive  for seizures. Negative for dizziness and weakness.   Psychiatric/Behavioral:  Negative for confusion.       Otherwise complete ROS reviewed and negative except as mentioned in the HPI.    Past Medical History:   Past Medical History:   Diagnosis Date    Hyperlipidemia     Seizures      Past Surgical History:  Past Surgical History:   Procedure Laterality Date    NO PAST SURGERIES       Social History:  reports that he has been smoking cigarettes. He does not have any smokeless tobacco history on file. He reports that he does not currently use drugs after having used the following drugs: Methamphetamines. He reports that he does not drink alcohol.    Family History: family history is not on file.       Allergies:  No Known Allergies    Medications:  Prior to Admission medications    Medication Sig Start Date End Date Taking? Authorizing Provider   ammonium lactate (LAC-HYDRIN) 12 % lotion  9/27/24   Aditya Aldana MD   busPIRone (BUSPAR) 5 MG tablet  9/25/24   Aditya Aldana MD   ibuprofen (ADVIL,MOTRIN) 800 MG tablet  9/27/24   Aditya Aldana MD   levETIRAcetam (KEPPRA) 500 MG tablet Take 1.5 tablets by mouth 2 (Two) Times a Day for 30 days. 9/27/24 10/27/24  Hesham Cohen MD   levETIRAcetam (KEPPRA) 750 MG tablet Take 1 tablet by mouth Every 12 (Twelve) Hours. 9/23/24   Aditya Aldana MD   melatonin 5 MG tablet tablet  9/27/24   Aditya Aldana MD   nicotine polacrilex (NICORETTE) 4 MG gum  9/23/24   Aditya Aldana MD   SIMVASTATIN PO Take  by mouth.    ProviderAditya MD     I have utilized all available immediate resources to obtain, update, or review the patient's current medications (including all prescriptions, over-the-counter products, herbals, cannabis/cannabidiol products, and vitamin/mineral/dietary (nutritional) supplements).    Objective     Vital Signs: /81 (BP Location: Left arm, Patient Position: Lying)   Pulse 89   Temp 98.2 °F (36.8 °C) (Oral)  "  Resp 16   Ht 182.9 cm (72\")   Wt 86.2 kg (190 lb)   SpO2 99%   BMI 25.77 kg/m²   Physical Exam  Vitals and nursing note reviewed.   Constitutional:       Appearance: Normal appearance.   HENT:      Head: Normocephalic.      Right Ear: Tympanic membrane normal.      Left Ear: Tympanic membrane normal.      Nose: Nose normal.      Mouth/Throat:      Mouth: Mucous membranes are moist.      Pharynx: Oropharynx is clear.   Eyes:      Pupils: Pupils are equal, round, and reactive to light.   Neck:      Comments: Mild neck soreness  Cardiovascular:      Rate and Rhythm: Normal rate and regular rhythm.      Pulses: Normal pulses.      Heart sounds: Normal heart sounds.   Pulmonary:      Effort: Pulmonary effort is normal.      Breath sounds: Normal breath sounds.   Abdominal:      General: Abdomen is flat. Bowel sounds are normal. There is no distension.      Palpations: Abdomen is soft.      Tenderness: There is no abdominal tenderness.   Genitourinary:     Comments: Per urinal  Musculoskeletal:         General: No tenderness. Normal range of motion.      Cervical back: Normal range of motion and neck supple. Tenderness present. No rigidity.      Right lower leg: No edema.      Left lower leg: No edema.   Skin:     General: Skin is warm and dry.      Capillary Refill: Capillary refill takes less than 2 seconds.   Neurological:      General: No focal deficit present.      Mental Status: He is alert and oriented to person, place, and time.      Cranial Nerves: Cranial nerves 2-12 are intact.      Motor: Motor function is intact.          Results Reviewed:  Lab Results (last 24 hours)       Procedure Component Value Units Date/Time    Urine Drug Screen - Urine, Clean Catch [[id number]] Collected: 09/28/24 1345    Specimen: Urine, Clean Catch Updated: 09/28/24 1350    Fentanyl, Urine - Urine, Clean Catch [[id number]] Collected: 09/28/24 1345    Specimen: Urine, Clean Catch Updated: 09/28/24 1350    Magnesium [[id number]]  " (Normal) Collected: 09/28/24 1146    Specimen: Blood from Arm, Right Updated: 09/28/24 1323     Magnesium 1.9 mg/dL     Comprehensive Metabolic Panel [[id number]]  (Abnormal) Collected: 09/28/24 1146    Specimen: Blood from Arm, Right Updated: 09/28/24 1218     Glucose 99 mg/dL      BUN 14 mg/dL      Creatinine 0.71 mg/dL      Sodium 143 mmol/L      Potassium 4.5 mmol/L      Comment: Slight hemolysis detected by analyzer. Result may be falsely elevated.        Chloride 107 mmol/L      CO2 28.0 mmol/L      Calcium 8.8 mg/dL      Total Protein 6.5 g/dL      Albumin 4.0 g/dL      ALT (SGPT) 15 U/L      AST (SGOT) 20 U/L      Comment: Slight hemolysis detected by analyzer. Result may be falsely elevated.        Alkaline Phosphatase 58 U/L      Total Bilirubin 0.3 mg/dL      Globulin 2.5 gm/dL      A/G Ratio 1.6 g/dL      BUN/Creatinine Ratio 19.7     Anion Gap 8.0 mmol/L      eGFR 128.2 mL/min/1.73     Narrative:      GFR Normal >60  Chronic Kidney Disease <60  Kidney Failure <15      CK [[id number]]  (Normal) Collected: 09/28/24 1146    Specimen: Blood from Arm, Right Updated: 09/28/24 1214     Creatine Kinase 85 U/L     Lactic Acid, Plasma [[id number]]  (Normal) Collected: 09/28/24 1146    Specimen: Blood from Arm, Right Updated: 09/28/24 1212     Lactate 1.8 mmol/L     Ethanol [[id number]] Collected: 09/28/24 1146    Specimen: Blood from Arm, Right Updated: 09/28/24 1209     Ethanol % <0.010 %     Narrative:      Not for legal purposes. Chain of Custody not followed.     CBC & Differential [[id number]]  (Normal) Collected: 09/28/24 1146    Specimen: Blood from Arm, Right Updated: 09/28/24 1156    Narrative:      The following orders were created for panel order CBC & Differential.  Procedure                               Abnormality         Status                     ---------                               -----------         ------                     CBC Auto Differential[[id number]]        Normal              Final  result                 Please view results for these tests on the individual orders.    CBC Auto Differential [[id number]]  (Normal) Collected: 09/28/24 1146    Specimen: Blood from Arm, Right Updated: 09/28/24 1156     WBC 6.96 10*3/mm3      RBC 4.52 10*6/mm3      Hemoglobin 13.5 g/dL      Hematocrit 40.7 %      MCV 90.0 fL      MCH 29.9 pg      MCHC 33.2 g/dL      RDW 13.0 %      RDW-SD 42.6 fl      MPV 9.3 fL      Platelets 232 10*3/mm3      Neutrophil % 58.9 %      Lymphocyte % 30.2 %      Monocyte % 8.3 %      Eosinophil % 1.9 %      Basophil % 0.4 %      Immature Grans % 0.3 %      Neutrophils, Absolute 4.10 10*3/mm3      Lymphocytes, Absolute 2.10 10*3/mm3      Monocytes, Absolute 0.58 10*3/mm3      Eosinophils, Absolute 0.13 10*3/mm3      Basophils, Absolute 0.03 10*3/mm3      Immature Grans, Absolute 0.02 10*3/mm3      nRBC 0.0 /100 WBC     Levetiracetam Level (Keppra) [[id number]] Collected: 09/28/24 1146    Specimen: Blood from Arm, Right Updated: 09/28/24 1152          Imaging Results (Last 24 Hours)       Procedure Component Value Units Date/Time    CT Lumbar Spine Without Contrast [[id number]] Collected: 09/28/24 1347     Updated: 09/28/24 1352    Narrative:      EXAM/TECHNIQUE: CT lumbar spine without contrast     INDICATION: trauma     COMPARISON: None available.     DLP: 1613.4 mGy.cm. Automated exposure control was also utilized to  decrease patient radiation dose.     FINDINGS:     5 lumbar-type vertebral bodies. Trace retrolisthesis of L5 on S1. No  other subluxations. Vertebral body heights are maintained. No acute  fracture. No central canal or neural foraminal stenosis. No suspicious  osseous lesion. Paravertebral soft tissues are unremarkable. 3 mm right  lower pole renal calculus.       Impression:      1.  No acute osseous findings.  2.  3 mm right lower pole renal calculus.        This report was signed and finalized on 9/28/2024 1:49 PM by Dr. Abner Porter MD.       CT Cervical Spine  Without Contrast [[id number]] Collected: 09/28/24 1340     Updated: 09/28/24 1347    Narrative:      EXAM/TECHNIQUE: CT cervical spine without contrast     INDICATION: trauma     COMPARISON: 9/27/2024     DLP: 1613.4 mGy.cm. Automated exposure control was also utilized to  decrease patient radiation dose.     FINDINGS:     Craniocervical relationships are maintained. The odontoid process is  intact. Cervical spine alignment is anatomic. Vertebral body heights are  maintained. No acute fracture or subluxation. No central canal or neural  foraminal stenosis. No disc space narrowing. Paravertebral soft tissues  are unremarkable.       Impression:      No acute fracture or subluxation.        This report was signed and finalized on 9/28/2024 1:44 PM by Dr. Abner Porter MD.       CT Head Without Contrast [[id number]] Collected: 09/28/24 1328     Updated: 09/28/24 1333    Narrative:      EXAM/TECHNIQUE: CT head without contrast     INDICATION: head injury     COMPARISON: 9/27/2024     DLP: 1613.4 mGy.cm. Automated exposure control was also utilized to  decrease patient radiation dose.     FINDINGS:     No evidence of intracranial hemorrhage. Gray-white differentiation is  maintained. No midline shift or mass effect. Lateral ventricles are  nondilated. Basilar cisterns are patent. Visualized portion of the  orbits are unremarkable. No acute superficial soft tissue abnormality.  Small right mastoid effusion. Left mastoid air cells are clear.  Visualized paranasal sinuses are clear. No acute osseous finding.       Impression:         No acute intracranial findings.        This report was signed and finalized on 9/28/2024 1:30 PM by Dr. Abner Porter MD.       XR Chest 1 View [[id number]] Collected: 09/28/24 1202     Updated: 09/28/24 1206    Narrative:      EXAM/TECHNIQUE: XR CHEST 1 VW-     INDICATION: seizure     COMPARISON: 9/27/2024     FINDINGS:     Cardiomediastinal silhouette is within normal limits.      No  pleural effusion. No visible pneumothorax. No focal consolidation.      No acute osseous findings.       Impression:         No acute findings.     This report was signed and finalized on 9/28/2024 12:03 PM by Dr. Abner Porter MD.             I have personally reviewed and interpreted the radiology studies and ECG obtained at time of admission.     Assessment / Plan   Assessment:   Active Hospital Problems    Diagnosis     **Seizure     Tobacco abuse     Noncompliance with medication regimen     History of drug abuse        Treatment Plan  The patient will be admitted to Dr. Garza's service here at [facility].     1.  Seizure-seizure x 24 hours, patient given 1500 mg of IV Keppra, resume patient's home 750 mg every 12 this evening.  Case was discussed with neurology recommended a stat MRI of the brain and EEG Ativan 1 mg every 2 hours as needed for any seizure activity continue seizure precautions, and notify provider of any new seizure activity.    2.  Tobacco abuse-NicoDerm Patch ordered, smoking cessation initiated.    3.  Noncompliance with medication regimen-case management consultation to assist patient with any difficulties with medication compliance or financial difficulties.    4.  History of drug abuse-patient has been sober for over a year, no signs or symptoms of drug abuse.  UDS negative.    VTE prophylaxis with SCDs  Labs in a.m.    Medical Decision Making  Seizure, acute on chronic, high complexity, unchanged  Tobacco abuse, chronic, moderate complexity, unchanged  Medication noncompliance, chronic, moderate complexity, unchanged  History of drug abuse, chronic, low complexity, stable  Number and Complexity of problems: 4  Differential Diagnosis: None    Conditions and Status        Condition is unchanged.     [facility] Data  External documents reviewed: None  Cardiac tracing (EKG, telemetry) interpretation: 9/28/2024 EKG per cardiology reviewed  Radiology interpretation: 9/28/2024  chest x-ray, CT of the head, lumbar and cervical spine per radiology reviewed  Labs reviewed: 9/28/2024 CK, CMP, lactate, CBC reviewed, repeat labs in a.m.  Any tests that were considered but not ordered: None     Decision rules/scores evaluated (example [id number], [place], etc): None     Discussed with: Dr. Garza and patient   Care Planning  Shared decision making: Dr. Garza and patient   code status and discussions: Full code per patient    Disposition  Social Determinants of Health that impact treatment or disposition: None determined at this time  Estimated length of stay is-2 days.     I confirmed that the patient's advanced care plan is present, code status is documented, and a surrogate decision maker is listed in the patient's medical record.     The patient's surrogate decision maker is his aunt Teagan Duggan.     The patient was seen and examined by me on 9/28/2024 at 1318.    Electronically signed by KITA Bernard, 09/28/24, 14:01 CDT.

## 2024-09-28 NOTE — ED NOTES
"Nursing report ED to floor  [place]  29 y.o.  male    HPI:   Chief Complaint   Patient presents with    Seizures       Admitting doctor:   Austyn Garza MD    Consulting provider(s):  Consults       No orders found from 8/30/2024 to 9/29/2024.             Admitting diagnosis:   The encounter diagnosis was Seizure disorder.    Code status:   Current Code Status       Date Active Code Status Order ID Comments User Context       9/28/2024 1351 CPR (Attempt to Resuscitate) [id number]  , [facility]        Question Answer    Code Status (Patient has no pulse and is not breathing) CPR (Attempt to Resuscitate)    Medical Interventions (Patient has pulse or is breathing) Full Support    Level Of Support Discussed With Patient                    Allergies:   Patient has no known allergies.    Intake and Output  No intake or output data in the 24 hours ending 09/28/24 1357    Weight:       09/28/24  1135   Weight: 86.2 kg (190 lb)       Most recent vitals:   Vitals:    09/28/24 1135 09/28/24 1246   BP: 116/85 125/81   BP Location: Left arm Left arm   Patient Position: Lying Lying   Pulse: 85 89   Resp: 16 16   Temp: 98.2 °F (36.8 °C)    TempSrc: Oral    SpO2: 98% 99%   Weight: 86.2 kg (190 lb)    Height: 182.9 cm (72\")      Oxygen Therapy: .    Active LDAs/IV Access:   Lines, Drains & Airways       Active LDAs       Name Placement date Placement time Site Days    Peripheral IV 09/28/24 1146 Right Antecubital 09/28/24  1146  Antecubital  less than 1                    Labs (abnormal labs have a star):   Labs Reviewed   COMPREHENSIVE METABOLIC PANEL - Abnormal; Notable for the following components:       Result Value    Creatinine 0.71 (*)     All other components within normal limits    Narrative:     GFR Normal >60  Chronic Kidney Disease <60  Kidney Failure <15     LACTIC ACID, PLASMA - Normal   CK - Normal   CBC WITH AUTO DIFFERENTIAL - Normal   MAGNESIUM - Normal   ETHANOL    Narrative:     Not " for legal purposes. Chain of Custody not followed.    URINE DRUG SCREEN   LEVETIRACETAM LEVEL   FENTANYL, URINE   CBC AND DIFFERENTIAL    Narrative:     The following orders were created for panel order CBC & Differential.  Procedure                               Abnormality         Status                     ---------                               -----------         ------                     CBC Auto Differential[[id number]]        Normal              Final result                 Please view results for these tests on the individual orders.       Meds given in ED:   Medications   levETIRAcetam (KEPPRA) injection 1,500 mg (1,500 mg Intravenous Given 9/28/24 1202)     No current facility-administered medications for this encounter.       NIH Stroke Scale:       Isolation/Infection(s):  No active isolations   No active infections     COVID Testing  Collected .  Resulted .    Nursing report ED to floor:  Mental status: . A/o x4  Ambulatory status: .  Precautions: .    ED nurse phone extentsion- ..  [phone]

## 2024-09-28 NOTE — PLAN OF CARE
Goal Outcome Evaluation:           Problem: Adult Inpatient Plan of Care  Goal: Plan of Care Review  9/28/2024 1451 by Wilder Salas RN  Outcome: Progressing  Flowsheets (Taken 9/28/2024 1448)  Outcome Evaluation: Recieved pt from ED report from Higinio Carmona. Pt alert and oriented x4, c/o shoulder pain from fall after seizure this am. Pt stated he had taken his Keppra this am. Pt lives at [facility]. Seizure precautions in place. Continue to monitor.

## 2024-09-28 NOTE — ED PROVIDER NOTES
Subjective   History of Present Illness  Patient is a 29-year-old who came to the ED with complains of having a seizure he was seen yesterday with Dr. Ramona Huerta for the same thing today he was in the bathroom had a seizure he states that he was compliant with medications this time.  Fell in the bathroom hit his head on the floor is complaining of some neck pain and lower back pain.  He is still postictal is moving upper and lower extremities there is no flaccid paralysis noted.  But is difficult to examine him completely because of his postictal state.  There is no obvious head injury there is no scalp hematomas.  Chest wall examination negative.  Exoskeletal examination unremarkable.  There is no deformities noted.  Patient tender in the lumbar spine no tenderness of the thoracic spine there is tenderness in the cervical spine cervical collar was in place and capped.  Patient was given 1500 mg of Keppra after a Keppra level was ordered.  Lab workup was initiated.    Seizures  Seizure activity on arrival: no    Seizure type:  Grand mal  Initial focality:  None  Episode characteristics: confusion, disorientation and generalized shaking    Postictal symptoms: confusion    Return to baseline: no    Severity:  Moderate  Timing:  Unable to specify  Progression:  Unable to specify  Context: medical non-compliance    Context: not change in medication, not drug use, not emotional upset, not intracranial shunt, not possible hypoglycemia, not possible medication ingestion, not pregnant and not previous head injury    Recent head injury:  No recent head injuries  PTA treatment:  None  History of seizures: yes    Similar to previous episodes: yes    Severity:  Moderate  Current therapy:  Levetiracetam  Compliance with current therapy:  Poor      Review of Systems   Constitutional: Negative.    HENT: Negative.     Gastrointestinal: Negative.  Negative for abdominal distention, abdominal pain and nausea.   Endocrine:  Negative.    Genitourinary: Negative.    Musculoskeletal: Negative.  Negative for back pain and neck pain.   Skin:  Negative for color change and pallor.   Neurological:  Positive for seizures. Negative for syncope, weakness, light-headedness, numbness and headaches.   Hematological: Negative.  Does not bruise/bleed easily.   All other systems reviewed and are negative.      Past Medical History:   Diagnosis Date    Hyperlipidemia     Seizures        No Known Allergies    Past Surgical History:   Procedure Laterality Date    NO PAST SURGERIES         History reviewed. No pertinent family history.    Social History     Socioeconomic History    Marital status: Single   Tobacco Use    Smoking status: Heavy Smoker     Current packs/day: 2.00     Types: Cigarettes   Substance and Sexual Activity    Alcohol use: No    Drug use: Not Currently     Types: Methamphetamines           Objective   Physical Exam  Vitals and nursing note reviewed. Exam conducted with a chaperone present.   Constitutional:       General: He is not in acute distress.     Appearance: Normal appearance. He is not ill-appearing or diaphoretic.   HENT:      Head: Normocephalic and atraumatic.      Nose: Nose normal.      Mouth/Throat:      Mouth: Mucous membranes are moist.      Pharynx: Oropharynx is clear.   Eyes:      General: No visual field deficit or scleral icterus.     Extraocular Movements: Extraocular movements intact.      Conjunctiva/sclera: Conjunctivae normal.      Pupils: Pupils are equal, round, and reactive to light.   Neck:      Vascular: No carotid bruit.      Comments: Cervical spine  stabilized the c-collar applied by the EMS  Cardiovascular:      Rate and Rhythm: Normal rate and regular rhythm.      Pulses: Normal pulses.      Heart sounds: No murmur heard.     No friction rub.   Pulmonary:      Effort: Pulmonary effort is normal. No respiratory distress.      Breath sounds: Normal breath sounds. No stridor.   Abdominal:       General: Abdomen is flat. Bowel sounds are normal. There is no distension.      Palpations: There is no mass.      Tenderness: There is no abdominal tenderness.   Musculoskeletal:         General: No swelling or tenderness. Normal range of motion.      Cervical back: Tenderness present. No signs of trauma or bony tenderness. Muscular tenderness present. Normal range of motion.      Thoracic back: Normal.      Lumbar back: Spasms and tenderness present. No signs of trauma or bony tenderness. Normal range of motion. No scoliosis.   Lymphadenopathy:      Cervical: No cervical adenopathy.   Skin:     General: Skin is warm.      Capillary Refill: Capillary refill takes less than 2 seconds.      Coloration: Skin is not jaundiced or pale.      Findings: No bruising or rash.   Neurological:      General: No focal deficit present.      Mental Status: He is alert and oriented to person, place, and time. He is confused.      GCS: GCS eye subscore is 4. GCS verbal subscore is 4. GCS motor subscore is 6.      Cranial Nerves: Cranial nerves 2-12 are intact. No cranial nerve deficit, dysarthria or facial asymmetry.      Sensory: Sensation is intact.      Motor: Motor function is intact. No weakness, abnormal muscle tone, seizure activity or pronator drift.      Coordination: Coordination is intact.      Deep Tendon Reflexes: Reflexes are normal and symmetric. Babinski sign absent on the right side. Babinski sign absent on the left side.      Reflex Scores:       Bicep reflexes are 2+ on the right side and 2+ on the left side.       Patellar reflexes are 2+ on the right side and 2+ on the left side.  Psychiatric:         Attention and Perception: Attention normal.         Mood and Affect: Mood and affect normal.         Speech: Speech normal.         Behavior: Behavior normal.         Procedures           ED Course  ED Course as of 09/28/24 1330   Sat Sep 28, 2024   1141  Patient is 18 or older, presenting with minor blunt head  trauma. Head CT (including cosigned orders) was ordered  by an emergency care clinician for trauma because patient has severe headache.        [TS]   1328 Patient with seizures complaining of back pain repeat examination moving all 4 extremities back to normal self.  No focal neurological deficits.  Neuro has seen the patient [TS]   1330 Patient was given Keppra in the ER [TS]      ED Course User Index  [TS] Hesham Cohen MD                                             Medical Decision Making  Patient with recurrent episodes of breakthrough seizures came to the ED with another seizure with a fall hitting his head.  Complain of some lower back pain and neck pain nonfocal neurological examination.    Problems Addressed:  Seizure disorder: complicated acute illness or injury    Amount and/or Complexity of Data Reviewed  Labs: ordered.     Details: As reviewed  Radiology: ordered.  ECG/medicine tests: ordered.  Discussion of management or test interpretation with external provider(s): Discussed with neurology and also with the ARNP for hospital service    Risk  Prescription drug management.  Decision regarding hospitalization.  Risk Details: To be admitted to medicine service further evaluation.        Final diagnoses:   Seizure disorder       ED Disposition  ED Disposition       ED Disposition   Decision to Admit    Condition   --    Comment   Level of Care: Telemetry [5]   Diagnosis: Seizure [205090]   Admitting Physician: COREY TALAVERA [[id number]]   Attending Physician: COREY TALAVERA [[id number]]                 No follow-up provider specified.       Medication List      No changes were made to your prescriptions during this visit.            Hesham Cohen MD  09/28/24 1150       Hesham Cohen MD  09/28/24 1330       Hesham Cohen MD  09/28/24 1330

## 2024-09-28 NOTE — CONSULTS
Neurology Consult Note    Consult Date: 2024  Referring MD: No ref. provider found  Reason for Consult: seizure    Patient: Gte Piedra (29 y.o. male)  MRN: [id number]  : 1995    History of Present Illness:   Get Piedra is a 29 y.o. male with PMH seizure since age 10, illicit drug use and reports abstinence for 7 months. He is from [facility]. He is currently in [facility] in [facility]. He tells me when he was at correctional facility more than 1 year ago near [place] he had seizure like spells and was placed on medication then but unsure of name. He was told when he has the spells to take deep breaths and that he needed to see a neurologist  and states he never saw one but then stated that he did and was told he needs to take deep breaths when he has a spell. He does not recall the type of seizure disorder he was diagnosed as child.  Patient was at [facility] on  for seizure like spells and given RX for Keppra 750 mg BID but did not get it from the pharmacy. He presented to [facility] on  with chest pain and was again given a prescription for Keppra but he tells me he did not get the med from pharmacy. He tells me in the last few months has had multiple seizure like episodes. He does not get an aura. He will sometimes bite his tongue and have urinary incontinence. He states he has never had MRI brain.       Patient was seen in [facility] ED on  with witnessed seizure like episode and reported he had not had his Keppra for 2 weeks. He was found with intermittent shaking for abot 15 minutes. EMS was called and patient appeared post ictal. CT head  no acute process. Patient was loaded with Keppra and discharged back to [place] home.   He presents to [facility] ED today for witnessed seizure like episode while trying to urinate. He did fall and strike his head. He has hard cervical collar in place. He denies tongue biting or bowel/bladder incontinence. Today CT head and  "CT CS to be done. Pateint loaded with Keppra 1500 mg IV. He is currently awake and alert and able to provide history.     Mg+ 1.9, CK 85,       Medical History:   Past Medical/Surgical Hx:  Past Medical History:   Diagnosis Date    Hyperlipidemia     Seizures      Past Surgical History:   Procedure Laterality Date    NO PAST SURGERIES         Medications On Admission:  (Not in a hospital admission)      Current Medications:  No current facility-administered medications for this encounter.    Current Outpatient Medications:     ammonium lactate (LAC-HYDRIN) 12 % lotion, , Disp: , Rfl:     busPIRone (BUSPAR) 5 MG tablet, , Disp: , Rfl:     ibuprofen (ADVIL,MOTRIN) 800 MG tablet, , Disp: , Rfl:     levETIRAcetam (KEPPRA) 500 MG tablet, Take 1.5 tablets by mouth 2 (Two) Times a Day for 30 days., Disp: 90 tablet, Rfl: 0    levETIRAcetam (KEPPRA) 750 MG tablet, Take 1 tablet by mouth Every 12 (Twelve) Hours., Disp: , Rfl:     melatonin 5 MG tablet tablet, , Disp: , Rfl:     nicotine polacrilex (NICORETTE) 4 MG gum, , Disp: , Rfl:     SIMVASTATIN PO, Take  by mouth., Disp: , Rfl:      Allergies:  No Known Allergies    Social Hx:  Social History     Socioeconomic History    Marital status: Single   Tobacco Use    Smoking status: Heavy Smoker     Current packs/day: 2.00     Types: Cigarettes   Substance and Sexual Activity    Alcohol use: No    Drug use: Not Currently     Types: Methamphetamines       Family Hx:  History reviewed. No pertinent family history.  Physical Examination:   Vital Signs:  Vitals:    09/28/24 1135 09/28/24 1246   BP: 116/85 125/81   BP Location: Left arm Left arm   Patient Position: Lying Lying   Pulse: 85 89   Resp: 16 16   Temp: 98.2 °F (36.8 °C)    TempSrc: Oral    SpO2: 98% 99%   Weight: 86.2 kg (190 lb)    Height: 182.9 cm (72\")          General Exam:  Head:  Normocephalic, atraumatic  HEENT:  cervical collar in place.   CVS:  Regular rate and rhythm.  No murmurs  Carotid Examination:  No " bruits  Lungs:  Clear to auscultation  Abdomen:  Nontender, Nondistended  Extremities:  No signs of peripheral edema  Skin:  No rashes    Neurologic Exam:    Mental Status:    -Awake, Alert, Oriented X 3  -No word finding difficulties  -No aphasia  -No dysarthria  -Follows simple and complex commands    CN II:  Visual fields full.  Pupils equally reactive to light  CN III, IV, VI:  Extraocular Muscles full with no signs of nystagmus  CN V:  Facial sensory is symmetric with no asymmetries.  CN VII:  Facial motor symmetric  CN VIII:  Gross hearing intact bilaterally  CN IX:  Palate elevates symmetrically  CN X:  Palate elevates symmetrically  CN XI:  Shoulder shrug symmetric  CN XII:  Tongue is midline on protrusion    Motor: (strength out of 5:  1= minimal movement, 2 = movement in plane of gravity, 3 = movement against gravity, 4 = movement against some resistance, 5 = full strength)    -Right Upper Ext: Proximal: 5 Distal: 5  -Left Upper Ext: Proximal: 5 Distal: 5    -Right Lower Ext: Proximal: 5 Distal: 5  -Left Lower Ext: Proximal: 5 Distal: 5    DTR:  -Right   Biceps: 2+ Triceps: 2+ Brachioradialis: 2+   Patella: 2+ Ankle: 2+ Neg Babinski  -Left   Biceps: 2+ Triceps: 2+ Brachioradialis: 2+   Patella: 2+ Ankle: 2+ Neg Babinski    Sensory:  -Intact to light touch, pinprick, temperature, pain, and proprioception    Coordination:  -Finger to nose intact  -Heel to shin intact  -No ataxia    Gait  -No signs of ataxia  -ambulates unassisted    Recent Diagnostics:   Laboratory Results:   - Reviewed in EMR  Lab Results   Component Value Date    GLUCOSE 99 09/28/2024    CALCIUM 8.8 09/28/2024     09/28/2024    K 4.5 09/28/2024    CO2 28.0 09/28/2024     09/28/2024    BUN 14 09/28/2024    CREATININE 0.71 (L) 09/28/2024    EGFRIFAFRI >150 08/25/2018    BCR 19.7 09/28/2024    ANIONGAP 8.0 09/28/2024     Lab Results   Component Value Date    WBC 6.96 09/28/2024    HGB 13.5 09/28/2024    HCT 40.7 09/28/2024    MCV  "90.0 09/28/2024     09/28/2024     No results found for: \"PTT\", \"INR\"  No results found for: \"CHOLTOT\", \"TRIG\", \"HDL\", \"LDL\"  No results found for: \"HGBA1C\"    Imaging Results:  Imaging Results (Last 24 Hours)       Procedure Component Value Units Date/Time    CT Head Without Contrast [[id number]] Resulted: 09/28/24 1246     Updated: 09/28/24 1246    CT Cervical Spine Without Contrast [[id number]] Resulted: 09/28/24 1246     Updated: 09/28/24 1246    CT Lumbar Spine Without Contrast [[id number]] Resulted: 09/28/24 1246     Updated: 09/28/24 1246    XR Chest 1 View [[id number]] Collected: 09/28/24 1202     Updated: 09/28/24 1206    Narrative:      EXAM/TECHNIQUE: XR CHEST 1 VW-     INDICATION: seizure     COMPARISON: 9/27/2024     FINDINGS:     Cardiomediastinal silhouette is within normal limits.      No pleural effusion. No visible pneumothorax. No focal consolidation.      No acute osseous findings.       Impression:         No acute findings.     This report was signed and finalized on 9/28/2024 12:03 PM by Dr. Abner Porter MD.                  Assessment & Plan:   Get Piedra is a 29 y.o. male with PMH seizure since age 10, illicit drug use and reports abstinence for 7 months. He is from [facility]. He is currently in [facility] in [facility]. He tells me when he was at correctional facility more than 1 year ago near [place] he had seizure like spells and was placed on medication then but unsure of name. He was told when he has the spells to take deep breaths and that he needed to see a neurologist  and states he never saw one but then stated that he did and was told he needs to take deep breaths when he has a spell. He does not recall the type of seizure disorder he was diagnosed as child.  Patient was at [facility] on 9/22 for seizure like spells and given RX for Keppra 750 mg BID but did not get it from the pharmacy. He presented to [facility] on 9/23 with chest pain and " was again given a prescription for Keppra but he tells me he did not get the med from pharmacy. He tells me in the last few months has had multiple seizure like episodes. He does not get an aura. He will sometimes bite his tongue and have urinary incontinence. He states he has never had MRI brain.   Patient has had 2 seizure like episodes in the last 24 hours. He has not taken Keppra in the last 2 week or more per his report.       Active Hospital Problems    Diagnosis  POA    Seizure [R56.9]  Unknown    Tobacco abuse [Z72.0]  Unknown    Noncompliance with medication regimen [Z91.148]  Not Applicable    History of drug abuse [F19.11]  Unknown      Impression:  Seizure like spells.  History of drug abuse.  Medication noncompliance.       Plan:   EEG  MRI brain with and without today.  Resume Keppra 750 mg PO BID.  Seizure precautions.Seizure precautions were discussed to include no tub baths, no swimming, avoiding lack of sleep, and avoiding known triggers. Education given of things that may contribute to a seizure to include, but not limited to: stressful situations, fever, fatigue, lack of sleep, low blood sugar, hyperventilation, flashing lights, and caffeine. Instructions given to take seizure medications as prescribed. Education given to family member on what to do during a seizure and care following the seizure. Education given to contact this office prior to stopping or changing any medications.  No driving for 90 days.  Consult case management to assist patient in getting his medications.       Neelima Peter, APRN  09/28/24  13:13 CDT    Medical Decision Making    Number/Complexity of Problems  Moderate  1 undiagnosed new problem with uncertain prognosis -   1 acute illness with systemic symptoms -   High  1 acute or chronic illness that poses a threat to life/body function -   high     MDM Data  Moderate - 1/3 categories  Extensive - 2/3 categories    Category 1: 3 of the following  Review of external  notes  Review of results  Ordering of each unique test  Independent historian  Category 2:  Independent interpretation of test (ex: imaging)  Category 3:  Discussion of management with another provider    Extensive discussed with Dr. Cohen.     Treatment Plan  Moderate - Prescription Drug management  High  Drug therapy requiring intensive monitoring for toxicity  Decision regarding hospitalization or escalation of care  De-escalate care/DNR decisions  high

## 2024-09-29 ENCOUNTER — READMISSION MANAGEMENT (OUTPATIENT)
Dept: CALL CENTER | Facility: HOSPITAL | Age: 29
End: 2024-09-29
Payer: MEDICAID

## 2024-09-29 VITALS
BODY MASS INDEX: 25.73 KG/M2 | OXYGEN SATURATION: 100 % | TEMPERATURE: 97.6 F | WEIGHT: 190 LBS | SYSTOLIC BLOOD PRESSURE: 137 MMHG | DIASTOLIC BLOOD PRESSURE: 84 MMHG | HEART RATE: 72 BPM | RESPIRATION RATE: 16 BRPM | HEIGHT: 72 IN

## 2024-09-29 LAB
ANION GAP SERPL CALCULATED.3IONS-SCNC: 9 MMOL/L (ref 5–15)
BASOPHILS # BLD AUTO: 0.05 10*3/MM3 (ref 0–0.2)
BASOPHILS NFR BLD AUTO: 0.7 % (ref 0–1.5)
BUN SERPL-MCNC: 14 MG/DL (ref 6–20)
BUN/CREAT SERPL: 21.5 (ref 7–25)
CALCIUM SPEC-SCNC: 8.9 MG/DL (ref 8.6–10.5)
CHLORIDE SERPL-SCNC: 105 MMOL/L (ref 98–107)
CO2 SERPL-SCNC: 26 MMOL/L (ref 22–29)
CREAT SERPL-MCNC: 0.65 MG/DL (ref 0.76–1.27)
DEPRECATED RDW RBC AUTO: 42 FL (ref 37–54)
EGFRCR SERPLBLD CKD-EPI 2021: 131.6 ML/MIN/1.73
EOSINOPHIL # BLD AUTO: 0.16 10*3/MM3 (ref 0–0.4)
EOSINOPHIL NFR BLD AUTO: 2.4 % (ref 0.3–6.2)
ERYTHROCYTE [DISTWIDTH] IN BLOOD BY AUTOMATED COUNT: 12.7 % (ref 12.3–15.4)
GLUCOSE SERPL-MCNC: 91 MG/DL (ref 65–99)
HCT VFR BLD AUTO: 42.7 % (ref 37.5–51)
HGB BLD-MCNC: 13.9 G/DL (ref 13–17.7)
IMM GRANULOCYTES # BLD AUTO: 0.01 10*3/MM3 (ref 0–0.05)
IMM GRANULOCYTES NFR BLD AUTO: 0.1 % (ref 0–0.5)
LYMPHOCYTES # BLD AUTO: 2.36 10*3/MM3 (ref 0.7–3.1)
LYMPHOCYTES NFR BLD AUTO: 35.1 % (ref 19.6–45.3)
MAGNESIUM SERPL-MCNC: 2 MG/DL (ref 1.6–2.6)
MCH RBC QN AUTO: 29.3 PG (ref 26.6–33)
MCHC RBC AUTO-ENTMCNC: 32.6 G/DL (ref 31.5–35.7)
MCV RBC AUTO: 90.1 FL (ref 79–97)
MONOCYTES # BLD AUTO: 0.58 10*3/MM3 (ref 0.1–0.9)
MONOCYTES NFR BLD AUTO: 8.6 % (ref 5–12)
NEUTROPHILS NFR BLD AUTO: 3.57 10*3/MM3 (ref 1.7–7)
NEUTROPHILS NFR BLD AUTO: 53.1 % (ref 42.7–76)
NRBC BLD AUTO-RTO: 0 /100 WBC (ref 0–0.2)
PLATELET # BLD AUTO: 233 10*3/MM3 (ref 140–450)
PMV BLD AUTO: 9.3 FL (ref 6–12)
POTASSIUM SERPL-SCNC: 4 MMOL/L (ref 3.5–5.2)
RBC # BLD AUTO: 4.74 10*6/MM3 (ref 4.14–5.8)
SODIUM SERPL-SCNC: 140 MMOL/L (ref 136–145)
WBC NRBC COR # BLD AUTO: 6.73 10*3/MM3 (ref 3.4–10.8)

## 2024-09-29 PROCEDURE — 99214 OFFICE O/P EST MOD 30 MIN: CPT | Performed by: CLINICAL NURSE SPECIALIST

## 2024-09-29 PROCEDURE — 83735 ASSAY OF MAGNESIUM: CPT

## 2024-09-29 PROCEDURE — 85025 COMPLETE CBC W/AUTO DIFF WBC: CPT

## 2024-09-29 PROCEDURE — 80048 BASIC METABOLIC PNL TOTAL CA: CPT

## 2024-09-29 PROCEDURE — G0378 HOSPITAL OBSERVATION PER HR: HCPCS

## 2024-09-29 RX ORDER — CYCLOBENZAPRINE HCL 10 MG
10 TABLET ORAL 2 TIMES DAILY PRN
Qty: 9 TABLET | Refills: 0 | Status: SHIPPED | OUTPATIENT
Start: 2024-09-29 | End: 2024-10-04

## 2024-09-29 RX ADMIN — DOCUSATE SODIUM 50 MG AND SENNOSIDES 8.6 MG 2 TABLET: 8.6; 5 TABLET, FILM COATED ORAL at 08:04

## 2024-09-29 RX ADMIN — POLYETHYLENE GLYCOL 3350 17 G: 17 POWDER, FOR SOLUTION ORAL at 08:04

## 2024-09-29 RX ADMIN — LEVETIRACETAM 750 MG: 500 TABLET, FILM COATED ORAL at 08:04

## 2024-09-29 RX ADMIN — BUSPIRONE HYDROCHLORIDE 5 MG: 5 TABLET ORAL at 06:08

## 2024-09-29 RX ADMIN — Medication 10 ML: at 09:00

## 2024-09-29 NOTE — DISCHARGE PLACEMENT REQUEST
"  Tiana [phone]  Giles Piedra (29 y.o. Male)       Date of Birth   1995    Social Security Number       Address   [address] [place] [address]    Home Phone   [phone]    MRN   [id number]       Nondenominational   None    Marital Status   Single                            Admission Date   9/28/24    Admission Type   Emergency    Admitting Provider   Austyn Garza MD    Attending Provider   Austyn Garza MD    Department, Room/Bed   [facility] 3A, 339/1       Discharge Date       Discharge Disposition   Home or Self Care    Discharge Destination                                 Attending Provider: Austyn Garza MD    Allergies: No Known Allergies    Isolation: None   Infection: None   Code Status: CPR    Ht: 182.9 cm (72\")   Wt: 86.2 kg (190 lb)    Admission Cmt: None   Principal Problem: Seizure [R56.9]                   Active Insurance as of 9/28/2024       Primary Coverage       Payor Plan Insurance Group Employer/Plan Group    [place] [id number]       Payor Plan Address Payor Plan Phone Number Payor Plan Fax Number Effective Dates    [employer] [address] [address] [phone]  4/22/2024 - None Entered    [facility] [id number]         Subscriber Name Subscriber Birth Date Member ID       GILES PIEDRA 1995 [id number]                     Emergency Contacts        (Rel.) Home Phone Work Phone Mobile Phone    MAKAYLA WU (Other) [phone] -- [phone]                 Discharge Summary        Aysha Crane APRN at 09/29/24 0618                [facility] Medicine Services  DISCHARGE SUMMARY       Date of Admission: 9/28/2024  Date of Discharge:  9/29/2024  Primary Care Physician: Nancy Winters APRN    Presenting Problem/History of Present Illness:  Seizure    Final Discharge Diagnoses:  Active Hospital Problems    Diagnosis     **Seizure     Tobacco " abuse     Noncompliance with medication regimen     History of drug abuse        Consults: Neurology    Procedures Performed: None    Pertinent Test Results:       Imaging Results (All)       Procedure Component Value Units Date/Time    MRI Brain With & Without Contrast [[id number]] Collected: 09/28/24 1547     Updated: 09/28/24 1605    Narrative:      MRI BRAIN W WO CONTRAST- 9/28/2024 1:39 PM     HISTORY: seizure; G40.909-Epilepsy, unspecified, not intractable,  without status epilepticus     COMPARISON: None.       TECHNIQUE: Multiplanar imaging of the brain was performed in a routine  fashion before and after the intravenous injection of gadolinium  contrast.     FINDINGS:  Diffusion weighted imaging does demonstrate some increased signal within  the splenium of the corpus callosum with equivocal signal alteration on  the ADC mapping exam. No additional sites of abnormal diffusion  restriction are demonstrated. No convincing evidence of acute ischemic  infarct.     There is no significant atrophy of the brain. On FLAIR sequencing there  is noted to be increased signal within the splenium of the corpus  callosum as well as mild diffuse increased T2 signal within the  periventricular and higher white matter tracts. A few punctate foci of  FLAIR abnormality are present perhaps representing mild small vessel  disease.     There is no mass, mass effect or shift of the midline. No evidence of  acute or chronic hemorrhage.     The orbits are normal in appearance.     The visualized paranasal sinuses and mastoid air cells are normally  aerated except for mucoperiosteal thickening of the left maxillary  sinus. No air-fluid levels present..     No asymmetries within the temporal lobes. No foci of abnormal contrast  enhancement are demonstrated.       Impression:      1. There is subtle increased signal and diffusion restriction associated  with the splenium of the corpus callosum. There is also rather symmetric  subtle  increased T2 signal in the periventricular and higher white  matter tracts, particularly noted in the region of the atrial trigone.  These are nonspecific findings to include cytotoxic lesions of the  corpus callosum. The constellation of findings can be seen with seizures  and/or metabolic disturbance. Follow-up recommended.  2. There is no mass effect or shift of the midline. No evidence of  atrophy. No asymmetries are noted within the temporal lobes.  3. Mucoperiosteal thickening of the left maxillary sinus. The visualized  paranasal sinuses and mastoid air cells are otherwise normally aerated.  The orbits are unremarkable. No abnormal contrast enhancement  demonstrated.     This report was signed and finalized on 9/28/2024 4:02 PM by Dr. Mitch Leger MD.       CT Lumbar Spine Without Contrast [[id number]] Collected: 09/28/24 1347     Updated: 09/28/24 1352    Narrative:      EXAM/TECHNIQUE: CT lumbar spine without contrast     INDICATION: trauma     COMPARISON: None available.     DLP: 1613.4 mGy.cm. Automated exposure control was also utilized to  decrease patient radiation dose.     FINDINGS:     5 lumbar-type vertebral bodies. Trace retrolisthesis of L5 on S1. No  other subluxations. Vertebral body heights are maintained. No acute  fracture. No central canal or neural foraminal stenosis. No suspicious  osseous lesion. Paravertebral soft tissues are unremarkable. 3 mm right  lower pole renal calculus.       Impression:      1.  No acute osseous findings.  2.  3 mm right lower pole renal calculus.        This report was signed and finalized on 9/28/2024 1:49 PM by Dr. Abner Porter MD.       CT Cervical Spine Without Contrast [[id number]] Collected: 09/28/24 1340     Updated: 09/28/24 1347    Narrative:      EXAM/TECHNIQUE: CT cervical spine without contrast     INDICATION: trauma     COMPARISON: 9/27/2024     DLP: 1613.4 mGy.cm. Automated exposure control was also utilized to  decrease patient radiation  dose.     FINDINGS:     Craniocervical relationships are maintained. The odontoid process is  intact. Cervical spine alignment is anatomic. Vertebral body heights are  maintained. No acute fracture or subluxation. No central canal or neural  foraminal stenosis. No disc space narrowing. Paravertebral soft tissues  are unremarkable.       Impression:      No acute fracture or subluxation.        This report was signed and finalized on 9/28/2024 1:44 PM by Dr. Abner Porter MD.       CT Head Without Contrast [[id number]] Collected: 09/28/24 1328     Updated: 09/28/24 1333    Narrative:      EXAM/TECHNIQUE: CT head without contrast     INDICATION: head injury     COMPARISON: 9/27/2024     DLP: 1613.4 mGy.cm. Automated exposure control was also utilized to  decrease patient radiation dose.     FINDINGS:     No evidence of intracranial hemorrhage. Gray-white differentiation is  maintained. No midline shift or mass effect. Lateral ventricles are  nondilated. Basilar cisterns are patent. Visualized portion of the  orbits are unremarkable. No acute superficial soft tissue abnormality.  Small right mastoid effusion. Left mastoid air cells are clear.  Visualized paranasal sinuses are clear. No acute osseous finding.       Impression:         No acute intracranial findings.        This report was signed and finalized on 9/28/2024 1:30 PM by Dr. Abner Porter MD.       XR Chest 1 View [[id number]] Collected: 09/28/24 1202     Updated: 09/28/24 1206    Narrative:      EXAM/TECHNIQUE: XR CHEST 1 VW-     INDICATION: seizure     COMPARISON: 9/27/2024     FINDINGS:     Cardiomediastinal silhouette is within normal limits.      No pleural effusion. No visible pneumothorax. No focal consolidation.      No acute osseous findings.       Impression:         No acute findings.     This report was signed and finalized on 9/28/2024 12:03 PM by Dr. Abner Porter MD.             LAB RESULTS:      Lab 09/29/24  0435 09/28/24  1146  09/27/24  1723 09/23/24  1821   WBC 6.73 6.96 7.24 8.4   HEMOGLOBIN 13.9 13.5 14.9 15.4   HEMATOCRIT 42.7 40.7 43.9 46.7   PLATELETS 233 232 255 257   NEUTROS ABS 3.57 4.10 3.92 4.6   IMMATURE GRANS (ABS) 0.01 0.02 0.01 0.0   LYMPHS ABS 2.36 2.10 2.50 2.8   MONOS ABS 0.58 0.58 0.59 0.80   EOS ABS 0.16 0.13 0.17 0.20   MCV 90.1 90.0 89.4 89.8   LACTATE  --  1.8  --   --          Lab 09/29/24  0435 09/28/24  1146 09/27/24  1723 09/23/24  1821   SODIUM 140 143 142 138   POTASSIUM 4.0 4.5 4.7 4.4   CHLORIDE 105 107 103 102   TOTAL CO2  --   --   --  26   CO2 26.0 28.0 29.0  --    ANION GAP 9.0 8.0 10.0 10   BUN 14 14 17 21*   CREATININE 0.65* 0.71* 0.71* 0.7   EGFR 131.6 128.2 128.2  --    GLUCOSE 91 99 87 95   CALCIUM 8.9 8.8 9.5 9.1   MAGNESIUM 2.0 1.9  --   --          Lab 09/28/24  1146 09/27/24  1723 09/23/24  1821   TOTAL PROTEIN 6.5 7.1 7.4   ALBUMIN 4.0 4.4 4.4   GLOBULIN 2.5 2.7  --    ALT (SGPT) 15 15 13   AST (SGOT) 20 20 19   BILIRUBIN 0.3 0.2 0.5   ALK PHOS 58 68 61         Lab 09/23/24  1821   PROBNP <36                 Brief Urine Lab Results  (Last result in the past 365 days)        Color   Clarity   Blood   Leuk Est   Nitrite   Protein   CREAT   Urine HCG        09/27/24 1753 Yellow   Turbid   Negative   Negative   Negative   Negative                 Microbiology Results (last 10 days)       ** No results found for the last 240 hours. **          Microbiology Results (last 10 days)       ** No results found for the last 240 hours. **             Documented weights    09/28/24 1135   Weight: 86.2 kg (190 lb)        Hospital Course: Get Piedra is a 29-year-old male with past medical history of hyperlipidemia, seizures, tobacco abuse, history of drug abuse and medical noncompliance.  Patient presented today with a recurrence of seizure activity.  Patient was seen yesterday in the ED for noncompliance with Keppra and seizure activity.  CT of the head and CT of the cervical spine and chest x-ray were  negative.  Patient's Jossie Coma Scale was 15 of 15, CNS and motor systems examination was unremarkable.  Patient was given Keppra and discharged home with prescription to have him follow-up with PCP and seizure precautions.  Patient presented again today with similar activity he said he was in the bathroom and had a seizure after taking his nighttime Keppra.  He fell in the bathroom and hit his head on the floor and his was complaining of some neck and lower back pain. CT of the head, lumbar and cervical spine pending.  He presented in a postictal state was difficult to ascertain much history however patient is tender in the lumbar spine, he was placed in a cervical collar pending labs and Keppra level.  Case was discussed with neurology per Dr. Cohen, who recommended overnight evaluation, EEG and MRI.  Patient will be admitted for overnight evaluation and treatment.     CK within normal limits, creatinine 0.71 lactate, CBC, UDS and urinalysis unremarkable  CRX no acute findings     Seizure precautions in place to include seizure padding to all rails.     Seizure-seizure x 24 hours, patient given 1500 mg of IV Keppra, resumed patient's home 750 mg every 12, no overnight seizure activity.  Neurology consultation with recommendation for EEG which was performed yesterday with no abnormalities, MRI of the brain case was performed yesterday as well with recommendations from neurology to resume Keppra 750 mg PO BID. Seizure precautions.Seizure precautions were discussed to include no tub baths, no swimming, avoiding lack of sleep, and avoiding known triggers. Education given of things that may contribute to a seizure to include, but not limited to: stressful situations, fever, fatigue, lack of sleep, low blood sugar, hyperventilation, flashing lights, and caffeine. Instructions given to take seizure medications as prescribed. Education given to family member on what to do during a seizure and care following the  "seizure. Education given to contact this office prior to stopping or changing any medications.No driving for 90 days.  Patient is to have a repeat MRI prior to his 6-week follow-up appointment with neurology.    Tobacco abuse-NicoDerm Patch ordered, smoking cessation verbally and with discharge instructions.     Noncompliance with medication regimen-case management consultation to assist patient with any difficulties with medication compliance or financial difficulties.     History of drug abuse-patient has been sober for over a year, no signs or symptoms of drug abuse.  UDS negative.    Today patient is resting comfortably in bed on room air with no family at bedside.  Patient is alert and oriented able to participate in assessment and discharge planning.  Patient verbalizes understanding of recommendations per neurology, he acknowledges the importance of medication compliance, he understands the seizure precautions given to him and the importance of no driving for 90 days.  Case management has given patient resources if any financial difficulties and patient is in agreement for discharge home today.       Patient has reached the maximum benefit of hospitalization and is stable for discharge    Patient has been evaluated today 09/29/24 and is stable for discharge.    Physical Exam on Discharge:  /59 (BP Location: Right arm, Patient Position: Lying)   Pulse 53   Temp 97.5 °F (36.4 °C) (Oral)   Resp 16   Ht 182.9 cm (72\")   Wt 86.2 kg (190 lb)   SpO2 96%   BMI 25.77 kg/m²   Physical Exam  Vitals and nursing note reviewed.   Constitutional:       Appearance: Normal appearance.   HENT:      Head: Normocephalic.      Right Ear: Tympanic membrane normal.      Left Ear: Tympanic membrane normal.      Nose: Nose normal.      Mouth/Throat:      Mouth: Mucous membranes are moist.      Pharynx: Oropharynx is clear.   Eyes:      Pupils: Pupils are equal, round, and reactive to light.   Neck:      Comments: Mild " neck soreness  Cardiovascular:      Rate and Rhythm: Normal rate and regular rhythm.      Pulses: Normal pulses.      Heart sounds: Normal heart sounds.   Pulmonary:      Effort: Pulmonary effort is normal.      Breath sounds: Normal breath sounds.   Abdominal:      General: Abdomen is flat. Bowel sounds are normal. There is no distension.      Palpations: Abdomen is soft.      Tenderness: There is no abdominal tenderness.   Genitourinary:     Comments: Per urinal  Musculoskeletal:         General: No tenderness. Normal range of motion.      Cervical back: Normal range of motion and neck supple. Tenderness present. No rigidity.      Right lower leg: No edema.      Left lower leg: No edema.   Skin:     General: Skin is warm and dry.      Capillary Refill: Capillary refill takes less than 2 seconds.   Neurological:      General: No focal deficit present.      Mental Status: He is alert and oriented to person, place, and time.      Cranial Nerves: Cranial nerves 2-12 are intact.      Motor: Motor function is intact.     Condition on Discharge: Stable for discharge home    Discharge Disposition:  Home or Self Care    Discharge Medications:     Discharge Medications        New Medications        Instructions Start Date   cyclobenzaprine 10 MG tablet  Commonly known as: FLEXERIL   10 mg, Oral, 2 Times Daily PRN             Continue These Medications        Instructions Start Date   busPIRone 5 MG tablet  Commonly known as: BUSPAR   5 mg, Oral, 3 Times Daily      Caplyta 10.5 MG capsule  Generic drug: Lumateperone Tosylate   1 capsule, Oral, Daily      ibuprofen 800 MG tablet  Commonly known as: ADVIL,MOTRIN   800 mg, Oral, Every 8 Hours PRN      levETIRAcetam 750 MG tablet  Commonly known as: KEPPRA   750 mg, Oral, Every 12 Hours Scheduled      melatonin 5 MG tablet tablet   5 mg, Oral, Nightly PRN      nicotine polacrilex 4 MG gum  Commonly known as: NICORETTE   4 mg, Mouth/Throat, As Needed               Discharge Diet:      Activity at Discharge:   Activity Instructions       Activity as Tolerated      Driving Restrictions      Type of Restriction: Driving    Driving Restrictions: No Driving (Time Limited)    Length: Other    Indicate Length of Restriction: 90 days    Other Activity Restrictions      Seizure precautions were discussed to include no tub baths, no swimming, avoiding lack of sleep, and avoiding known triggers. Education given of things that may contribute to a seizure to include, but not limited to: stressful situations, fever, fatigue, lack of sleep, low blood sugar, hyperventilation, flashing lights, and caffeine. Instructions given to take seizure medications as prescribed. Education given to family member on what to do during a seizure and care following the seizure. Education given to contact this office prior to stopping or changing any medications.  No driving for 90 days.    Type of Restriction: Other    Explain Other Restrictions: Seizure Precautions            Discharge Instructions:   1.  Patient was instructed to return for medical attention for any new or worsening seizure like symptoms, decreased level of consciousness or seizure activity.  2.  Patient was instructed to follow-up with PCP in 1 week.  3.  Patient was instructed to follow-up with [facility] in 6 weeks with an MRI ordered with results to be sent to [facility]  4.  Patient was instructed verbally and with written instructions on his seizure precautions, driving restrictions, and medication compliance.  Follow-up Appointments:   No future appointments.    Test Results Pending at Discharge: None    Electronically signed by KITA Bernard-BC, 09/29/24, 08:38 CDT.    Time: 35 minutes.          Electronically signed by Aysha Crane APRN at 09/29/24 0839

## 2024-09-29 NOTE — PLAN OF CARE
Goal Outcome Evaluation:  Plan of Care Reviewed With: patient        Progress: improving  Outcome Evaluation: Pt alert and oriented x4, deies pain and no seizure activity noted. Pt discharged to [facility] and  will pick him up.
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
## 2024-09-29 NOTE — DISCHARGE SUMMARY
[facility] Medicine Services  DISCHARGE SUMMARY       Date of Admission: 9/28/2024  Date of Discharge:  9/29/2024  Primary Care Physician: Nancy Winters APRN    Presenting Problem/History of Present Illness:  Seizure    Final Discharge Diagnoses:  Active Hospital Problems    Diagnosis     **Seizure     Tobacco abuse     Noncompliance with medication regimen     History of drug abuse        Consults: Neurology    Procedures Performed: None    Pertinent Test Results:       Imaging Results (All)       Procedure Component Value Units Date/Time    MRI Brain With & Without Contrast [[id number]] Collected: 09/28/24 1547     Updated: 09/28/24 1605    Narrative:      MRI BRAIN W WO CONTRAST- 9/28/2024 1:39 PM     HISTORY: seizure; G40.909-Epilepsy, unspecified, not intractable,  without status epilepticus     COMPARISON: None.       TECHNIQUE: Multiplanar imaging of the brain was performed in a routine  fashion before and after the intravenous injection of gadolinium  contrast.     FINDINGS:  Diffusion weighted imaging does demonstrate some increased signal within  the splenium of the corpus callosum with equivocal signal alteration on  the ADC mapping exam. No additional sites of abnormal diffusion  restriction are demonstrated. No convincing evidence of acute ischemic  infarct.     There is no significant atrophy of the brain. On FLAIR sequencing there  is noted to be increased signal within the splenium of the corpus  callosum as well as mild diffuse increased T2 signal within the  periventricular and higher white matter tracts. A few punctate foci of  FLAIR abnormality are present perhaps representing mild small vessel  disease.     There is no mass, mass effect or shift of the midline. No evidence of  acute or chronic hemorrhage.     The orbits are normal in appearance.     The visualized paranasal sinuses and mastoid air cells are normally  aerated except for mucoperiosteal  thickening of the left maxillary  sinus. No air-fluid levels present..     No asymmetries within the temporal lobes. No foci of abnormal contrast  enhancement are demonstrated.       Impression:      1. There is subtle increased signal and diffusion restriction associated  with the splenium of the corpus callosum. There is also rather symmetric  subtle increased T2 signal in the periventricular and higher white  matter tracts, particularly noted in the region of the atrial trigone.  These are nonspecific findings to include cytotoxic lesions of the  corpus callosum. The constellation of findings can be seen with seizures  and/or metabolic disturbance. Follow-up recommended.  2. There is no mass effect or shift of the midline. No evidence of  atrophy. No asymmetries are noted within the temporal lobes.  3. Mucoperiosteal thickening of the left maxillary sinus. The visualized  paranasal sinuses and mastoid air cells are otherwise normally aerated.  The orbits are unremarkable. No abnormal contrast enhancement  demonstrated.     This report was signed and finalized on 9/28/2024 4:02 PM by Dr. Mitch Leger MD.       CT Lumbar Spine Without Contrast [[id number]] Collected: 09/28/24 1347     Updated: 09/28/24 1352    Narrative:      EXAM/TECHNIQUE: CT lumbar spine without contrast     INDICATION: trauma     COMPARISON: None available.     DLP: 1613.4 mGy.cm. Automated exposure control was also utilized to  decrease patient radiation dose.     FINDINGS:     5 lumbar-type vertebral bodies. Trace retrolisthesis of L5 on S1. No  other subluxations. Vertebral body heights are maintained. No acute  fracture. No central canal or neural foraminal stenosis. No suspicious  osseous lesion. Paravertebral soft tissues are unremarkable. 3 mm right  lower pole renal calculus.       Impression:      1.  No acute osseous findings.  2.  3 mm right lower pole renal calculus.        This report was signed and finalized on 9/28/2024 1:49  PM by Dr. Abner Porter MD.       CT Cervical Spine Without Contrast [[id number]] Collected: 09/28/24 1340     Updated: 09/28/24 1347    Narrative:      EXAM/TECHNIQUE: CT cervical spine without contrast     INDICATION: trauma     COMPARISON: 9/27/2024     DLP: 1613.4 mGy.cm. Automated exposure control was also utilized to  decrease patient radiation dose.     FINDINGS:     Craniocervical relationships are maintained. The odontoid process is  intact. Cervical spine alignment is anatomic. Vertebral body heights are  maintained. No acute fracture or subluxation. No central canal or neural  foraminal stenosis. No disc space narrowing. Paravertebral soft tissues  are unremarkable.       Impression:      No acute fracture or subluxation.        This report was signed and finalized on 9/28/2024 1:44 PM by Dr. Abner Porter MD.       CT Head Without Contrast [[id number]] Collected: 09/28/24 1328     Updated: 09/28/24 1333    Narrative:      EXAM/TECHNIQUE: CT head without contrast     INDICATION: head injury     COMPARISON: 9/27/2024     DLP: 1613.4 mGy.cm. Automated exposure control was also utilized to  decrease patient radiation dose.     FINDINGS:     No evidence of intracranial hemorrhage. Gray-white differentiation is  maintained. No midline shift or mass effect. Lateral ventricles are  nondilated. Basilar cisterns are patent. Visualized portion of the  orbits are unremarkable. No acute superficial soft tissue abnormality.  Small right mastoid effusion. Left mastoid air cells are clear.  Visualized paranasal sinuses are clear. No acute osseous finding.       Impression:         No acute intracranial findings.        This report was signed and finalized on 9/28/2024 1:30 PM by Dr. Abner Porter MD.       XR Chest 1 View [[id number]] Collected: 09/28/24 1202     Updated: 09/28/24 1206    Narrative:      EXAM/TECHNIQUE: XR CHEST 1 VW-     INDICATION: seizure     COMPARISON: 9/27/2024     FINDINGS:      Cardiomediastinal silhouette is within normal limits.      No pleural effusion. No visible pneumothorax. No focal consolidation.      No acute osseous findings.       Impression:         No acute findings.     This report was signed and finalized on 9/28/2024 12:03 PM by Dr. Abner Porter MD.             LAB RESULTS:      Lab 09/29/24  0435 09/28/24  1146 09/27/24  1723 09/23/24  1821   WBC 6.73 6.96 7.24 8.4   HEMOGLOBIN 13.9 13.5 14.9 15.4   HEMATOCRIT 42.7 40.7 43.9 46.7   PLATELETS 233 232 255 257   NEUTROS ABS 3.57 4.10 3.92 4.6   IMMATURE GRANS (ABS) 0.01 0.02 0.01 0.0   LYMPHS ABS 2.36 2.10 2.50 2.8   MONOS ABS 0.58 0.58 0.59 0.80   EOS ABS 0.16 0.13 0.17 0.20   MCV 90.1 90.0 89.4 89.8   LACTATE  --  1.8  --   --          Lab 09/29/24  0435 09/28/24  1146 09/27/24  1723 09/23/24  1821   SODIUM 140 143 142 138   POTASSIUM 4.0 4.5 4.7 4.4   CHLORIDE 105 107 103 102   TOTAL CO2  --   --   --  26   CO2 26.0 28.0 29.0  --    ANION GAP 9.0 8.0 10.0 10   BUN 14 14 17 21*   CREATININE 0.65* 0.71* 0.71* 0.7   EGFR 131.6 128.2 128.2  --    GLUCOSE 91 99 87 95   CALCIUM 8.9 8.8 9.5 9.1   MAGNESIUM 2.0 1.9  --   --          Lab 09/28/24  1146 09/27/24  1723 09/23/24  1821   TOTAL PROTEIN 6.5 7.1 7.4   ALBUMIN 4.0 4.4 4.4   GLOBULIN 2.5 2.7  --    ALT (SGPT) 15 15 13   AST (SGOT) 20 20 19   BILIRUBIN 0.3 0.2 0.5   ALK PHOS 58 68 61         Lab 09/23/24  1821   PROBNP <36                 Brief Urine Lab Results  (Last result in the past 365 days)        Color   Clarity   Blood   Leuk Est   Nitrite   Protein   CREAT   Urine HCG        09/27/24 1753 Yellow   Turbid   Negative   Negative   Negative   Negative                 Microbiology Results (last 10 days)       ** No results found for the last 240 hours. **          Microbiology Results (last 10 days)       ** No results found for the last 240 hours. **             Documented weights    09/28/24 1135   Weight: 86.2 kg (190 lb)        Hospital Course: Get Piedra  is a 29-year-old male with past medical history of hyperlipidemia, seizures, tobacco abuse, history of drug abuse and medical noncompliance.  Patient presented today with a recurrence of seizure activity.  Patient was seen yesterday in the ED for noncompliance with Keppra and seizure activity.  CT of the head and CT of the cervical spine and chest x-ray were negative.  Patient's Jossie Coma Scale was 15 of 15, CNS and motor systems examination was unremarkable.  Patient was given Keppra and discharged home with prescription to have him follow-up with PCP and seizure precautions.  Patient presented again today with similar activity he said he was in the bathroom and had a seizure after taking his nighttime Keppra.  He fell in the bathroom and hit his head on the floor and his was complaining of some neck and lower back pain. CT of the head, lumbar and cervical spine pending.  He presented in a postictal state was difficult to ascertain much history however patient is tender in the lumbar spine, he was placed in a cervical collar pending labs and Keppra level.  Case was discussed with neurology per Dr. Cohen, who recommended overnight evaluation, EEG and MRI.  Patient will be admitted for overnight evaluation and treatment.     CK within normal limits, creatinine 0.71 lactate, CBC, UDS and urinalysis unremarkable  CRX no acute findings     Seizure precautions in place to include seizure padding to all rails.     Seizure-seizure x 24 hours, patient given 1500 mg of IV Keppra, resumed patient's home 750 mg every 12, no overnight seizure activity.  Neurology consultation with recommendation for EEG which was performed yesterday with no abnormalities, MRI of the brain case was performed yesterday as well with recommendations from neurology to resume Keppra 750 mg PO BID. Seizure precautions.Seizure precautions were discussed to include no tub baths, no swimming, avoiding lack of sleep, and avoiding known triggers.  "Education given of things that may contribute to a seizure to include, but not limited to: stressful situations, fever, fatigue, lack of sleep, low blood sugar, hyperventilation, flashing lights, and caffeine. Instructions given to take seizure medications as prescribed. Education given to family member on what to do during a seizure and care following the seizure. Education given to contact this office prior to stopping or changing any medications.No driving for 90 days.  Patient is to have a repeat MRI prior to his 6-week follow-up appointment with neurology.    Tobacco abuse-NicoDerm Patch ordered, smoking cessation verbally and with discharge instructions.     Noncompliance with medication regimen-case management consultation to assist patient with any difficulties with medication compliance or financial difficulties.     History of drug abuse-patient has been sober for over a year, no signs or symptoms of drug abuse.  UDS negative.    Today patient is resting comfortably in bed on room air with no family at bedside.  Patient is alert and oriented able to participate in assessment and discharge planning.  Patient verbalizes understanding of recommendations per neurology, he acknowledges the importance of medication compliance, he understands the seizure precautions given to him and the importance of no driving for 90 days.  Case management has given patient resources if any financial difficulties and patient is in agreement for discharge home today.       Patient has reached the maximum benefit of hospitalization and is stable for discharge    Patient has been evaluated today 09/29/24 and is stable for discharge.    Physical Exam on Discharge:  /59 (BP Location: Right arm, Patient Position: Lying)   Pulse 53   Temp 97.5 °F (36.4 °C) (Oral)   Resp 16   Ht 182.9 cm (72\")   Wt 86.2 kg (190 lb)   SpO2 96%   BMI 25.77 kg/m²   Physical Exam  Vitals and nursing note reviewed.   Constitutional:       " Appearance: Normal appearance.   HENT:      Head: Normocephalic.      Right Ear: Tympanic membrane normal.      Left Ear: Tympanic membrane normal.      Nose: Nose normal.      Mouth/Throat:      Mouth: Mucous membranes are moist.      Pharynx: Oropharynx is clear.   Eyes:      Pupils: Pupils are equal, round, and reactive to light.   Neck:      Comments: Mild neck soreness  Cardiovascular:      Rate and Rhythm: Normal rate and regular rhythm.      Pulses: Normal pulses.      Heart sounds: Normal heart sounds.   Pulmonary:      Effort: Pulmonary effort is normal.      Breath sounds: Normal breath sounds.   Abdominal:      General: Abdomen is flat. Bowel sounds are normal. There is no distension.      Palpations: Abdomen is soft.      Tenderness: There is no abdominal tenderness.   Genitourinary:     Comments: Per urinal  Musculoskeletal:         General: No tenderness. Normal range of motion.      Cervical back: Normal range of motion and neck supple. Tenderness present. No rigidity.      Right lower leg: No edema.      Left lower leg: No edema.   Skin:     General: Skin is warm and dry.      Capillary Refill: Capillary refill takes less than 2 seconds.   Neurological:      General: No focal deficit present.      Mental Status: He is alert and oriented to person, place, and time.      Cranial Nerves: Cranial nerves 2-12 are intact.      Motor: Motor function is intact.     Condition on Discharge: Stable for discharge home    Discharge Disposition:  Home or Self Care    Discharge Medications:     Discharge Medications        New Medications        Instructions Start Date   cyclobenzaprine 10 MG tablet  Commonly known as: FLEXERIL   10 mg, Oral, 2 Times Daily PRN             Continue These Medications        Instructions Start Date   busPIRone 5 MG tablet  Commonly known as: BUSPAR   5 mg, Oral, 3 Times Daily      Caplyta 10.5 MG capsule  Generic drug: Lumateperone Tosylate   1 capsule, Oral, Daily      ibuprofen 800  MG tablet  Commonly known as: ADVIL,MOTRIN   800 mg, Oral, Every 8 Hours PRN      levETIRAcetam 750 MG tablet  Commonly known as: KEPPRA   750 mg, Oral, Every 12 Hours Scheduled      melatonin 5 MG tablet tablet   5 mg, Oral, Nightly PRN      nicotine polacrilex 4 MG gum  Commonly known as: NICORETTE   4 mg, Mouth/Throat, As Needed               Discharge Diet:     Activity at Discharge:   Activity Instructions       Activity as Tolerated      Driving Restrictions      Type of Restriction: Driving    Driving Restrictions: No Driving (Time Limited)    Length: Other    Indicate Length of Restriction: 90 days    Other Activity Restrictions      Seizure precautions were discussed to include no tub baths, no swimming, avoiding lack of sleep, and avoiding known triggers. Education given of things that may contribute to a seizure to include, but not limited to: stressful situations, fever, fatigue, lack of sleep, low blood sugar, hyperventilation, flashing lights, and caffeine. Instructions given to take seizure medications as prescribed. Education given to family member on what to do during a seizure and care following the seizure. Education given to contact this office prior to stopping or changing any medications.  No driving for 90 days.    Type of Restriction: Other    Explain Other Restrictions: Seizure Precautions            Discharge Instructions:   1.  Patient was instructed to return for medical attention for any new or worsening seizure like symptoms, decreased level of consciousness or seizure activity.  2.  Patient was instructed to follow-up with PCP in 1 week.  3.  Patient was instructed to follow-up with [facility] in 6 weeks with an MRI ordered with results to be sent to [facility]  4.  Patient was instructed verbally and with written instructions on his seizure precautions, driving restrictions, and medication compliance.  Follow-up Appointments:   No future appointments.    Test Results  Pending at Discharge: None    Electronically signed by Aysha Crane, SU, 09/29/24, 08:38 CDT.    Time: 35 minutes.

## 2024-09-30 ENCOUNTER — TRANSITIONAL CARE MANAGEMENT TELEPHONE ENCOUNTER (OUTPATIENT)
Dept: CALL CENTER | Facility: HOSPITAL | Age: 29
End: 2024-09-30
Payer: MEDICAID

## 2024-09-30 ENCOUNTER — TELEPHONE (OUTPATIENT)
Dept: NEUROLOGY | Facility: CLINIC | Age: 29
End: 2024-09-30

## 2024-09-30 ENCOUNTER — HOSPITAL ENCOUNTER (EMERGENCY)
Age: 29
Discharge: HOME | End: 2024-09-30
Attending: EMERGENCY MEDICINE
Payer: MEDICAID

## 2024-09-30 VITALS
HEART RATE: 83 BPM | RESPIRATION RATE: 17 BRPM | OXYGEN SATURATION: 100 % | DIASTOLIC BLOOD PRESSURE: 83 MMHG | TEMPERATURE: 97.7 F | SYSTOLIC BLOOD PRESSURE: 125 MMHG

## 2024-09-30 DIAGNOSIS — R56.9 SEIZURE (HCC): Primary | ICD-10-CM

## 2024-09-30 PROCEDURE — 6370000000 HC RX 637 (ALT 250 FOR IP): Performed by: EMERGENCY MEDICINE

## 2024-09-30 PROCEDURE — 99283 EMERGENCY DEPT VISIT LOW MDM: CPT

## 2024-09-30 RX ORDER — LEVETIRACETAM 500 MG/1
750 TABLET ORAL ONCE
Status: COMPLETED | OUTPATIENT
Start: 2024-09-30 | End: 2024-09-30

## 2024-09-30 RX ADMIN — LEVETIRACETAM 750 MG: 500 TABLET, FILM COATED ORAL at 21:57

## 2024-09-30 NOTE — OUTREACH NOTE
Call Center TCM Note      Flowsheet Row Responses   [facility] patient discharged from? [place]   Does the patient have one of the following disease processes/diagnoses(primary or secondary)? Other   TCM attempt successful? No   Unsuccessful attempts Attempt 1   Revoked Reason Other  [Phone number listed is a correctional facility. No verbal release on file.]            Tracey May LPN    9/30/2024, 13:18 CDT

## 2024-09-30 NOTE — PROGRESS NOTES
ATTEMPTED TO CALL PATIENT, SPOKE TO PATIENTS EMERGENCY CONTACT AND SHE STATED TO CALL [phone] AND THEY WOULD GET HIM.  PATIENT IS IN A REHAB FACILITY AND THEY WILL HAVE HIM TO CALL BACK.

## 2024-09-30 NOTE — TELEPHONE ENCOUNTER
Caller: [facility]    Relationship to patient: Self    Best call back number: [phone]      New or established patient?  [x] New  [] Established    Date of discharge: *09/29/2024    Facility discharged from:  [facility]    Diagnosis/Symptoms: SEIZURES    Length of stay (If applicable): 1 DAY    Specialty Only: Did you see a [facility] provider?    [x] Yes  [] No  If so, who? KITA ODOM      MARVIN W/ [facility] REQ CALL TO PT FOR SCHEDULING HOSP F/U WITH KITA ODOM 6-8 WEEKS.      PLEASE REVIEW AND ADVISE

## 2024-10-01 NOTE — ED PROVIDER NOTES
[facility] EMERGENCY DEPT  eMERGENCY dEPARTMENT eNCOUnter      Pt Name: Cole Duran  MRN: [id number]  Birthdate 1995  Date of evaluation: 9/30/2024  Provider: Stewart Santiago MD    CHIEF COMPLAINT       Chief Complaint   Patient presents with    Seizures     Has had 2 seizures today, was due for his keppra when second seizure occurred per [facility] per EMS         HISTORY OF PRESENT ILLNESS   (Location/Symptom, Timing/Onset,Context/Setting, Quality, Duration, Modifying Factors, Severity)  Note limiting factors.   Cole Duran is a 29 y.o. male who presents to the emergency department for evaluation regarding a seizure event.  Patient states he has a known previous history of seizures and has been maintained on Keppra however he had not taken his dose yet today.  Patient is currently at [facility] undergoing rehab.  By the time EMS had arrived patient was back to his baseline mental status and was not postictal.  States he has not had recent illnesses, fever or chills.  At this time he states he really does not want any further evaluation.  He is agreeable to take his usual evening dose of Keppra here in the ED.    HPI    NursingNotes were reviewed.    REVIEW OF SYSTEMS    (2-9 systems for level 4, 10 or more for level 5)     Review of Systems   Constitutional:  Negative for chills and fever.   Respiratory:  Negative for shortness of breath.    Cardiovascular:  Negative for chest pain.   Gastrointestinal:  Negative for abdominal pain.   Neurological:  Positive for seizures.   All other systems reviewed and are negative.           PAST MEDICALHISTORY     Past Medical History:   Diagnosis Date    Seizures (HCC)          SURGICAL HISTORY     No past surgical history on file.      CURRENT MEDICATIONS     There are no discharge medications for this patient.      ALLERGIES     Patient has no known allergies.    FAMILY HISTORY     No family history on file.       SOCIAL HISTORY       Social History     Socioeconomic

## 2024-10-02 ENCOUNTER — APPOINTMENT (OUTPATIENT)
Dept: CT IMAGING | Age: 29
End: 2024-10-02
Payer: MEDICAID

## 2024-10-02 ENCOUNTER — HOSPITAL ENCOUNTER (EMERGENCY)
Age: 29
Discharge: HOME | End: 2024-10-02
Attending: EMERGENCY MEDICINE
Payer: MEDICAID

## 2024-10-02 VITALS
HEIGHT: 72 IN | DIASTOLIC BLOOD PRESSURE: 80 MMHG | RESPIRATION RATE: 20 BRPM | WEIGHT: 190 LBS | SYSTOLIC BLOOD PRESSURE: 117 MMHG | TEMPERATURE: 99.5 F | HEART RATE: 89 BPM | BODY MASS INDEX: 25.73 KG/M2 | OXYGEN SATURATION: 96 %

## 2024-10-02 DIAGNOSIS — S16.1XXA ACUTE STRAIN OF NECK MUSCLE, INITIAL ENCOUNTER: ICD-10-CM

## 2024-10-02 DIAGNOSIS — G40.919 BREAKTHROUGH SEIZURE (HCC): Primary | ICD-10-CM
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
## 2024-10-02 LAB
ALBUMIN SERPL-MCNC: 4.3 G/DL (ref 3.5–5.2)
ALP SERPL-CCNC: 60 U/L (ref 40–129)
ALT SERPL-CCNC: 14 U/L (ref 5–41)
ANION GAP SERPL CALCULATED.3IONS-SCNC: 12 MMOL/L (ref 7–19)
AST SERPL-CCNC: 17 U/L (ref 5–40)
BASOPHILS # BLD: 0.1 K/UL (ref 0–0.2)
BASOPHILS NFR BLD: 0.6 % (ref 0–1)
BILIRUB SERPL-MCNC: 0.3 MG/DL (ref 0.2–1.2)
BUN SERPL-MCNC: 19 MG/DL (ref 6–20)
CALCIUM SERPL-MCNC: 8.7 MG/DL (ref 8.6–10)
CHLORIDE SERPL-SCNC: 103 MMOL/L (ref 98–111)
CO2 SERPL-SCNC: 26 MMOL/L (ref 22–29)
CREAT SERPL-MCNC: 0.8 MG/DL (ref 0.7–1.2)
EOSINOPHIL # BLD: 0.2 K/UL (ref 0–0.6)
EOSINOPHIL NFR BLD: 2.3 % (ref 0–5)
ERYTHROCYTE [DISTWIDTH] IN BLOOD BY AUTOMATED COUNT: 12.2 % (ref 11.5–14.5)
GLUCOSE SERPL-MCNC: 103 MG/DL (ref 70–99)
HCT VFR BLD AUTO: 41.1 % (ref 42–52)
HGB BLD-MCNC: 13.6 G/DL (ref 14–18)
IMM GRANULOCYTES # BLD: 0 K/UL
LEVETIRACETAM SERPL-MCNC: 17.7 UG/ML (ref 10–40)
LYMPHOCYTES # BLD: 2.8 K/UL (ref 1.1–4.5)
LYMPHOCYTES NFR BLD: 35.5 % (ref 20–40)
MCH RBC QN AUTO: 30 PG (ref 27–31)
MCHC RBC AUTO-ENTMCNC: 33.1 G/DL (ref 33–37)
MCV RBC AUTO: 90.5 FL (ref 80–94)
MONOCYTES # BLD: 0.7 K/UL (ref 0–0.9)
MONOCYTES NFR BLD: 9.1 % (ref 0–10)
NEUTROPHILS # BLD: 4.2 K/UL (ref 1.5–7.5)
NEUTS SEG NFR BLD: 52.2 % (ref 50–65)
PLATELET # BLD AUTO: 259 K/UL (ref 130–400)
PMV BLD AUTO: 9 FL (ref 9.4–12.4)
POTASSIUM SERPL-SCNC: 4 MMOL/L (ref 3.5–5)
PROT SERPL-MCNC: 6.7 G/DL (ref 6.4–8.3)
RBC # BLD AUTO: 4.54 M/UL (ref 4.7–6.1)
SODIUM SERPL-SCNC: 141 MMOL/L (ref 136–145)
WBC # BLD AUTO: 8 K/UL (ref 4.8–10.8)

## 2024-10-02 PROCEDURE — 85025 COMPLETE CBC W/AUTO DIFF WBC: CPT

## 2024-10-02 PROCEDURE — 96374 THER/PROPH/DIAG INJ IV PUSH: CPT

## 2024-10-02 PROCEDURE — 72125 CT NECK SPINE W/O DYE: CPT

## 2024-10-02 PROCEDURE — 70450 CT HEAD/BRAIN W/O DYE: CPT

## 2024-10-02 PROCEDURE — 99284 EMERGENCY DEPT VISIT MOD MDM: CPT

## 2024-10-02 PROCEDURE — 80053 COMPREHEN METABOLIC PANEL: CPT

## 2024-10-02 PROCEDURE — 6360000002 HC RX W HCPCS: Performed by: EMERGENCY MEDICINE

## 2024-10-02 PROCEDURE — 36415 COLL VENOUS BLD VENIPUNCTURE: CPT

## 2024-10-02 PROCEDURE — 2580000003 HC RX 258: Performed by: EMERGENCY MEDICINE

## 2024-10-02 RX ORDER — LEVETIRACETAM 750 MG/1
750 TABLET ORAL EVERY 12 HOURS SCHEDULED
COMMUNITY
Start: 2024-09-23

## 2024-10-02 RX ORDER — LEVETIRACETAM 500 MG/5ML
1000 INJECTION, SOLUTION, CONCENTRATE INTRAVENOUS ONCE
Status: COMPLETED | OUTPATIENT
Start: 2024-10-02 | End: 2024-10-02

## 2024-10-02 RX ORDER — SODIUM CHLORIDE, SODIUM LACTATE, POTASSIUM CHLORIDE, AND CALCIUM CHLORIDE .6; .31; .03; .02 G/100ML; G/100ML; G/100ML; G/100ML
1000 INJECTION, SOLUTION INTRAVENOUS ONCE
Status: COMPLETED | OUTPATIENT
Start: 2024-10-02 | End: 2024-10-02

## 2024-10-02 RX ADMIN — LEVETIRACETAM 1000 MG: 100 INJECTION INTRAVENOUS at 22:08

## 2024-10-02 RX ADMIN — SODIUM CHLORIDE, POTASSIUM CHLORIDE, SODIUM LACTATE AND CALCIUM CHLORIDE 1000 ML: 600; 310; 30; 20 INJECTION, SOLUTION INTRAVENOUS at 22:28

## 2024-10-02 ASSESSMENT — PAIN DESCRIPTION - LOCATION: LOCATION: NECK;HEAD

## 2024-10-02 ASSESSMENT — PAIN SCALES - GENERAL: PAINLEVEL_OUTOF10: 10

## 2024-10-02 ASSESSMENT — PAIN - FUNCTIONAL ASSESSMENT: PAIN_FUNCTIONAL_ASSESSMENT: 0-10

## 2024-10-03 ASSESSMENT — ENCOUNTER SYMPTOMS
EYES NEGATIVE: 1
RESPIRATORY NEGATIVE: 1
GASTROINTESTINAL NEGATIVE: 1

## 2024-10-03 NOTE — ED PROVIDER NOTES
[facility] EMERGENCY DEPT  EMERGENCY DEPARTMENT ENCOUNTER      Pt Name: Cole Duran  MRN: [id number]  Birthdate 1995  Date of evaluation: 10/2/2024  Provider: Jak Jessica Jr, MD    CHIEF COMPLAINT       Chief Complaint   Patient presents with    Seizures     Witnessed seizure at [facility] tonight. Pt was given 750 mg Keppra tonight by [employer]. EMS reports postictal upon arrival. Pt alert and oriented at this time.          HISTORY OF PRESENT ILLNESS   (Location/Symptom, Timing/Onset,Context/Setting, Quality, Duration, Modifying Factors, Severity)  Note limiting factors.   Cole Duran is a 29 y.o. male who presents to the emergency department for evaluation after [facility] [employer] [employer] [facility]  Patient reportedly fell off the bed and potentially hit his head.  Complaining of neck pain.  Cervical collar placed by EMS.  Patient has a history of seizure disorder since he was 10 years old.  Takes 750 mg Keppra twice daily and says he has taken all of his medications as prescribed recently.  Says that he was outside playing basketball in the heat today but denies any other recent changes or potential trigger to breakthrough seizure.  Says he has seizures not infrequently despite treatment.    HPI    NursingNotes were reviewed.    REVIEW OF SYSTEMS    (2-9 systems for level 4, 10 or more for level 5)     Review of Systems   Constitutional: Negative.    HENT: Negative.     Eyes: Negative.    Respiratory: Negative.     Cardiovascular: Negative.    Gastrointestinal: Negative.    Genitourinary: Negative.    Musculoskeletal:  Positive for neck pain.   Skin: Negative.    Neurological:  Positive for seizures.   Hematological: Negative.    Psychiatric/Behavioral: Negative.         A complete review of systems was performed and is negative except as noted above in the HPI.       PAST MEDICAL HISTORY     Past Medical History:   Diagnosis Date    Seizures (HCC)          SURGICAL HISTORY     History reviewed. No pertinent  (2) 61-74 years

## 2024-10-05 ASSESSMENT — ENCOUNTER SYMPTOMS
SHORTNESS OF BREATH: 0
ABDOMINAL PAIN: 0

## 2024-10-08 ENCOUNTER — HOSPITAL ENCOUNTER (EMERGENCY)
Age: 29
Discharge: HOME | End: 2024-10-08
Attending: PEDIATRICS
Payer: MEDICAID

## 2024-10-08 VITALS
HEART RATE: 80 BPM | TEMPERATURE: 98 F | RESPIRATION RATE: 21 BRPM | SYSTOLIC BLOOD PRESSURE: 126 MMHG | OXYGEN SATURATION: 100 % | DIASTOLIC BLOOD PRESSURE: 79 MMHG

## 2024-10-08 DIAGNOSIS — G40.919 BREAKTHROUGH SEIZURE (HCC): Primary | ICD-10-CM

## 2024-10-08 LAB
ALBUMIN SERPL-MCNC: 4.5 G/DL (ref 3.5–5.2)
ALP SERPL-CCNC: 65 U/L (ref 40–129)
ALT SERPL-CCNC: 31 U/L (ref 5–41)
ANION GAP SERPL CALCULATED.3IONS-SCNC: 11 MMOL/L (ref 7–19)
AST SERPL-CCNC: 31 U/L (ref 5–40)
BASOPHILS # BLD: 0.1 K/UL (ref 0–0.2)
BASOPHILS NFR BLD: 0.6 % (ref 0–1)
BILIRUB SERPL-MCNC: <0.2 MG/DL (ref 0.2–1.2)
BUN SERPL-MCNC: 11 MG/DL (ref 6–20)
CALCIUM SERPL-MCNC: 9.1 MG/DL (ref 8.6–10)
CHLORIDE SERPL-SCNC: 102 MMOL/L (ref 98–111)
CO2 SERPL-SCNC: 26 MMOL/L (ref 22–29)
CREAT SERPL-MCNC: 1 MG/DL (ref 0.7–1.2)
EOSINOPHIL # BLD: 0.2 K/UL (ref 0–0.6)
EOSINOPHIL NFR BLD: 2 % (ref 0–5)
ERYTHROCYTE [DISTWIDTH] IN BLOOD BY AUTOMATED COUNT: 12.3 % (ref 11.5–14.5)
GLUCOSE SERPL-MCNC: 94 MG/DL (ref 70–99)
HCT VFR BLD AUTO: 43.6 % (ref 42–52)
HGB BLD-MCNC: 14.3 G/DL (ref 14–18)
IMM GRANULOCYTES # BLD: 0 K/UL
LYMPHOCYTES # BLD: 3.1 K/UL (ref 1.1–4.5)
LYMPHOCYTES NFR BLD: 38.1 % (ref 20–40)
MCH RBC QN AUTO: 29.8 PG (ref 27–31)
MCHC RBC AUTO-ENTMCNC: 32.8 G/DL (ref 33–37)
MCV RBC AUTO: 90.8 FL (ref 80–94)
MONOCYTES # BLD: 0.9 K/UL (ref 0–0.9)
MONOCYTES NFR BLD: 11.3 % (ref 0–10)
NEUTROPHILS # BLD: 3.8 K/UL (ref 1.5–7.5)
NEUTS SEG NFR BLD: 47.7 % (ref 50–65)
PLATELET # BLD AUTO: 268 K/UL (ref 130–400)
PMV BLD AUTO: 8.9 FL (ref 9.4–12.4)
POTASSIUM SERPL-SCNC: 4.4 MMOL/L (ref 3.5–5)
PROT SERPL-MCNC: 7.1 G/DL (ref 6.4–8.3)
RBC # BLD AUTO: 4.8 M/UL (ref 4.7–6.1)
SODIUM SERPL-SCNC: 139 MMOL/L (ref 136–145)
WBC # BLD AUTO: 8 K/UL (ref 4.8–10.8)

## 2024-10-08 PROCEDURE — 99284 EMERGENCY DEPT VISIT MOD MDM: CPT

## 2024-10-08 PROCEDURE — 80053 COMPREHEN METABOLIC PANEL: CPT

## 2024-10-08 PROCEDURE — 93005 ELECTROCARDIOGRAM TRACING: CPT | Performed by: PEDIATRICS

## 2024-10-08 PROCEDURE — 36415 COLL VENOUS BLD VENIPUNCTURE: CPT

## 2024-10-08 PROCEDURE — 6370000000 HC RX 637 (ALT 250 FOR IP): Performed by: PEDIATRICS

## 2024-10-08 PROCEDURE — 85025 COMPLETE CBC W/AUTO DIFF WBC: CPT

## 2024-10-08 RX ORDER — LEVETIRACETAM 500 MG/1
750 TABLET ORAL ONCE
Status: COMPLETED | OUTPATIENT
Start: 2024-10-08 | End: 2024-10-08

## 2024-10-08 RX ORDER — LEVETIRACETAM 500 MG/1
750 TABLET ORAL 2 TIMES DAILY
Qty: 90 TABLET | Refills: 0 | Status: SHIPPED
Start: 2024-10-08 | End: 2024-10-09 | Stop reason: ALTCHOICE

## 2024-10-08 RX ORDER — LEVETIRACETAM 500 MG/5ML
1000 INJECTION, SOLUTION, CONCENTRATE INTRAVENOUS ONCE
Status: DISCONTINUED | OUTPATIENT
Start: 2024-10-08 | End: 2024-10-08

## 2024-10-08 RX ADMIN — LEVETIRACETAM 750 MG: 500 TABLET, FILM COATED ORAL at 22:47

## 2024-10-09 ENCOUNTER — HOSPITAL ENCOUNTER (EMERGENCY)
Age: 29
Discharge: HOME | End: 2024-10-09
Attending: STUDENT IN AN ORGANIZED HEALTH CARE EDUCATION/TRAINING PROGRAM
Payer: MEDICAID

## 2024-10-09 ENCOUNTER — HOSPITAL ENCOUNTER (EMERGENCY)
Age: 29
Discharge: HOME | End: 2024-10-09
Attending: PEDIATRICS
Payer: MEDICAID

## 2024-10-09 ENCOUNTER — DOCUMENTATION (OUTPATIENT)
Dept: NEUROLOGY | Facility: CLINIC | Age: 29
End: 2024-10-09
Payer: MEDICAID

## 2024-10-09 VITALS
RESPIRATION RATE: 18 BRPM | HEIGHT: 72 IN | WEIGHT: 190 LBS | DIASTOLIC BLOOD PRESSURE: 90 MMHG | HEART RATE: 88 BPM | OXYGEN SATURATION: 99 % | SYSTOLIC BLOOD PRESSURE: 138 MMHG | BODY MASS INDEX: 25.73 KG/M2 | TEMPERATURE: 98.1 F

## 2024-10-09 VITALS
RESPIRATION RATE: 14 BRPM | HEART RATE: 78 BPM | OXYGEN SATURATION: 95 % | SYSTOLIC BLOOD PRESSURE: 100 MMHG | TEMPERATURE: 98.8 F | DIASTOLIC BLOOD PRESSURE: 67 MMHG

## 2024-10-09 DIAGNOSIS — G40.919 BREAKTHROUGH SEIZURE (HCC): Primary | ICD-10-CM

## 2024-10-09 LAB
ALBUMIN SERPL-MCNC: 4.4 G/DL (ref 3.5–5.2)
ALBUMIN SERPL-MCNC: 4.5 G/DL (ref 3.5–5.2)
ALP SERPL-CCNC: 61 U/L (ref 40–129)
ALP SERPL-CCNC: 66 U/L (ref 40–129)
ALT SERPL-CCNC: 35 U/L (ref 5–41)
ALT SERPL-CCNC: 38 U/L (ref 5–41)
AMPHET UR QL SCN: NEGATIVE
ANION GAP SERPL CALCULATED.3IONS-SCNC: 11 MMOL/L (ref 7–19)
ANION GAP SERPL CALCULATED.3IONS-SCNC: 8 MMOL/L (ref 7–19)
AST SERPL-CCNC: 29 U/L (ref 5–40)
AST SERPL-CCNC: 30 U/L (ref 5–40)
BARBITURATES UR QL SCN: NEGATIVE
BASOPHILS # BLD: 0 K/UL (ref 0–0.2)
BASOPHILS # BLD: 0.1 K/UL (ref 0–0.2)
BASOPHILS NFR BLD: 0.6 % (ref 0–1)
BASOPHILS NFR BLD: 0.6 % (ref 0–1)
BENZODIAZ UR QL SCN: NEGATIVE
BILIRUB SERPL-MCNC: 0.3 MG/DL (ref 0.2–1.2)
BILIRUB SERPL-MCNC: 0.3 MG/DL (ref 0.2–1.2)
BILIRUB UR QL STRIP: NEGATIVE
BUN SERPL-MCNC: 11 MG/DL (ref 6–20)
BUN SERPL-MCNC: 9 MG/DL (ref 6–20)
BUPRENORPHINE URINE: NEGATIVE
CALCIUM SERPL-MCNC: 9.2 MG/DL (ref 8.6–10)
CALCIUM SERPL-MCNC: 9.2 MG/DL (ref 8.6–10)
CANNABINOIDS UR QL SCN: NEGATIVE
CHLORIDE SERPL-SCNC: 101 MMOL/L (ref 98–111)
CHLORIDE SERPL-SCNC: 102 MMOL/L (ref 98–111)
CLARITY UR: CLEAR
CO2 SERPL-SCNC: 26 MMOL/L (ref 22–29)
CO2 SERPL-SCNC: 29 MMOL/L (ref 22–29)
COCAINE UR QL SCN: NEGATIVE
COLOR UR: YELLOW
CREAT SERPL-MCNC: 0.8 MG/DL (ref 0.7–1.2)
CREAT SERPL-MCNC: 0.9 MG/DL (ref 0.7–1.2)
DRUG SCREEN COMMENT UR-IMP: NORMAL
EKG P AXIS: -66 DEGREES
EKG P-R INTERVAL: 114 MS
EKG Q-T INTERVAL: 312 MS
EKG QRS DURATION: 100 MS
EKG QTC CALCULATION (BAZETT): 361 MS
EKG T AXIS: 66 DEGREES
EOSINOPHIL # BLD: 0.1 K/UL (ref 0–0.6)
EOSINOPHIL # BLD: 0.2 K/UL (ref 0–0.6)
EOSINOPHIL NFR BLD: 1.1 % (ref 0–5)
EOSINOPHIL NFR BLD: 2.3 % (ref 0–5)
ERYTHROCYTE [DISTWIDTH] IN BLOOD BY AUTOMATED COUNT: 12.6 % (ref 11.5–14.5)
ERYTHROCYTE [DISTWIDTH] IN BLOOD BY AUTOMATED COUNT: 12.6 % (ref 11.5–14.5)
FENTANYL SCREEN, URINE: NEGATIVE
GLUCOSE SERPL-MCNC: 100 MG/DL (ref 70–99)
GLUCOSE SERPL-MCNC: 90 MG/DL (ref 70–99)
GLUCOSE UR STRIP.AUTO-MCNC: NEGATIVE MG/DL
HCT VFR BLD AUTO: 45.1 % (ref 42–52)
HCT VFR BLD AUTO: 45.6 % (ref 42–52)
HGB BLD-MCNC: 14.8 G/DL (ref 14–18)
HGB BLD-MCNC: 15 G/DL (ref 14–18)
HGB UR STRIP.AUTO-MCNC: NEGATIVE MG/L
IMM GRANULOCYTES # BLD: 0 K/UL
IMM GRANULOCYTES # BLD: 0 K/UL
KETONES UR STRIP.AUTO-MCNC: NEGATIVE MG/DL
LEUKOCYTE ESTERASE UR QL STRIP.AUTO: NEGATIVE
LYMPHOCYTES # BLD: 1.9 K/UL (ref 1.1–4.5)
LYMPHOCYTES # BLD: 2.2 K/UL (ref 1.1–4.5)
LYMPHOCYTES NFR BLD: 22.8 % (ref 20–40)
LYMPHOCYTES NFR BLD: 33.8 % (ref 20–40)
MCH RBC QN AUTO: 30.1 PG (ref 27–31)
MCH RBC QN AUTO: 30.3 PG (ref 27–31)
MCHC RBC AUTO-ENTMCNC: 32.8 G/DL (ref 33–37)
MCHC RBC AUTO-ENTMCNC: 32.9 G/DL (ref 33–37)
MCV RBC AUTO: 91.4 FL (ref 80–94)
MCV RBC AUTO: 92.2 FL (ref 80–94)
METHADONE UR QL SCN: NEGATIVE
METHAMPHETAMINE, URINE: NEGATIVE
MONOCYTES # BLD: 0.7 K/UL (ref 0–0.9)
MONOCYTES # BLD: 0.8 K/UL (ref 0–0.9)
MONOCYTES NFR BLD: 11.3 % (ref 0–10)
MONOCYTES NFR BLD: 9.3 % (ref 0–10)
NEUTROPHILS # BLD: 3.4 K/UL (ref 1.5–7.5)
NEUTROPHILS # BLD: 5.6 K/UL (ref 1.5–7.5)
NEUTS SEG NFR BLD: 51.5 % (ref 50–65)
NEUTS SEG NFR BLD: 66 % (ref 50–65)
NITRITE UR QL STRIP.AUTO: NEGATIVE
OPIATES UR QL SCN: NEGATIVE
OXYCODONE UR QL SCN: NEGATIVE
PCP UR QL SCN: NEGATIVE
PH UR STRIP.AUTO: 7.5 [PH] (ref 5–8)
PLATELET # BLD AUTO: 256 K/UL (ref 130–400)
PLATELET # BLD AUTO: 284 K/UL (ref 130–400)
PMV BLD AUTO: 8.8 FL (ref 9.4–12.4)
PMV BLD AUTO: 9 FL (ref 9.4–12.4)
POTASSIUM SERPL-SCNC: 4 MMOL/L (ref 3.5–5)
POTASSIUM SERPL-SCNC: 4.2 MMOL/L (ref 3.5–5)
PROT SERPL-MCNC: 7.1 G/DL (ref 6.4–8.3)
PROT SERPL-MCNC: 7.2 G/DL (ref 6.4–8.3)
PROT UR STRIP.AUTO-MCNC: NEGATIVE MG/DL
RBC # BLD AUTO: 4.89 M/UL (ref 4.7–6.1)
RBC # BLD AUTO: 4.99 M/UL (ref 4.7–6.1)
SODIUM SERPL-SCNC: 138 MMOL/L (ref 136–145)
SODIUM SERPL-SCNC: 139 MMOL/L (ref 136–145)
SP GR UR STRIP.AUTO: 1.01 (ref 1–1.03)
TRICYCLIC ANTIDEPRESSANTS, UR: NEGATIVE
UROBILINOGEN UR STRIP.AUTO-MCNC: 0.2 E.U./DL
WBC # BLD AUTO: 6.6 K/UL (ref 4.8–10.8)
WBC # BLD AUTO: 8.4 K/UL (ref 4.8–10.8)

## 2024-10-09 PROCEDURE — 93005 ELECTROCARDIOGRAM TRACING: CPT | Performed by: PEDIATRICS

## 2024-10-09 PROCEDURE — 6360000002 HC RX W HCPCS: Performed by: PEDIATRICS

## 2024-10-09 PROCEDURE — 96365 THER/PROPH/DIAG IV INF INIT: CPT

## 2024-10-09 PROCEDURE — 80177 DRUG SCRN QUAN LEVETIRACETAM: CPT

## 2024-10-09 PROCEDURE — G0480 DRUG TEST DEF 1-7 CLASSES: HCPCS

## 2024-10-09 PROCEDURE — 36415 COLL VENOUS BLD VENIPUNCTURE: CPT

## 2024-10-09 PROCEDURE — 2580000003 HC RX 258: Performed by: PEDIATRICS

## 2024-10-09 PROCEDURE — 99284 EMERGENCY DEPT VISIT MOD MDM: CPT

## 2024-10-09 PROCEDURE — 93010 ELECTROCARDIOGRAM REPORT: CPT | Performed by: INTERNAL MEDICINE

## 2024-10-09 PROCEDURE — 85025 COMPLETE CBC W/AUTO DIFF WBC: CPT

## 2024-10-09 PROCEDURE — 6370000000 HC RX 637 (ALT 250 FOR IP): Performed by: STUDENT IN AN ORGANIZED HEALTH CARE EDUCATION/TRAINING PROGRAM

## 2024-10-09 PROCEDURE — 80053 COMPREHEN METABOLIC PANEL: CPT

## 2024-10-09 PROCEDURE — 96360 HYDRATION IV INFUSION INIT: CPT

## 2024-10-09 PROCEDURE — 2580000003 HC RX 258: Performed by: STUDENT IN AN ORGANIZED HEALTH CARE EDUCATION/TRAINING PROGRAM

## 2024-10-09 PROCEDURE — 81003 URINALYSIS AUTO W/O SCOPE: CPT

## 2024-10-09 PROCEDURE — 80307 DRUG TEST PRSMV CHEM ANLYZR: CPT

## 2024-10-09 PROCEDURE — 96375 TX/PRO/DX INJ NEW DRUG ADDON: CPT

## 2024-10-09 RX ORDER — LEVETIRACETAM 500 MG/5ML
1000 INJECTION, SOLUTION, CONCENTRATE INTRAVENOUS ONCE
Status: DISCONTINUED | OUTPATIENT
Start: 2024-10-09 | End: 2024-10-09

## 2024-10-09 RX ORDER — 0.9 % SODIUM CHLORIDE 0.9 %
1000 INTRAVENOUS SOLUTION INTRAVENOUS ONCE
Status: COMPLETED | OUTPATIENT
Start: 2024-10-09 | End: 2024-10-09

## 2024-10-09 RX ORDER — LEVETIRACETAM 500 MG/5ML
1500 INJECTION, SOLUTION, CONCENTRATE INTRAVENOUS ONCE
Status: COMPLETED | OUTPATIENT
Start: 2024-10-09 | End: 2024-10-09

## 2024-10-09 RX ORDER — PHENYTOIN SODIUM 100 MG/1
100 CAPSULE, EXTENDED RELEASE ORAL ONCE
Status: COMPLETED | OUTPATIENT
Start: 2024-10-09 | End: 2024-10-09

## 2024-10-09 RX ORDER — PHENYTOIN SODIUM 100 MG/1
100 CAPSULE, EXTENDED RELEASE ORAL 3 TIMES DAILY
Qty: 90 CAPSULE | Refills: 0 | Status: SHIPPED | OUTPATIENT
Start: 2024-10-09 | End: 2024-11-08

## 2024-10-09 RX ADMIN — SODIUM CHLORIDE 1000 ML: 9 INJECTION, SOLUTION INTRAVENOUS at 20:22

## 2024-10-09 RX ADMIN — FOSPHENYTOIN SODIUM 1725 MG PE: 50 INJECTION, SOLUTION INTRAMUSCULAR; INTRAVENOUS at 14:15

## 2024-10-09 RX ADMIN — PHENYTOIN SODIUM 100 MG: 100 CAPSULE ORAL at 20:21

## 2024-10-09 RX ADMIN — LEVETIRACETAM 1500 MG: 100 INJECTION INTRAVENOUS at 10:50

## 2024-10-09 ASSESSMENT — ENCOUNTER SYMPTOMS
CHEST TIGHTNESS: 0
COUGH: 0
ABDOMINAL PAIN: 0
BACK PAIN: 0
NAUSEA: 0
EYE PAIN: 0
VOMITING: 0
SHORTNESS OF BREATH: 0
NAUSEA: 0
DIARRHEA: 0
SHORTNESS OF BREATH: 0
SORE THROAT: 0
ABDOMINAL PAIN: 0
BLOOD IN STOOL: 0
COUGH: 0
VOMITING: 0
EYE REDNESS: 0
RHINORRHEA: 0

## 2024-10-09 ASSESSMENT — PAIN - FUNCTIONAL ASSESSMENT: PAIN_FUNCTIONAL_ASSESSMENT: NONE - DENIES PAIN

## 2024-10-09 NOTE — DISCHARGE INSTRUCTIONS
Seek medical attention with prolonged seizure activity greater than 5 minutes, difficulty breathing, or other concerns.  Rest your brain-do not use electronic devices for 24 to 48 hours.  Follow-up with BRUCE Sheridan, neurology-call for appointment.  Observe seizure precautions and avoid activities that may result in harm should you seize.  Stop Keppra.

## 2024-10-09 NOTE — PROGRESS NOTES
I received a call from Dr. Dumont with [facility] in regards to this patient having a seizure today.  She is requesting to speak with Neelima Peter NP.  I did explain to her that Neelima Peter NP is out of the office until tomorrow.  Dr. Dumont was concerned this patient does not have a hospital follow up with a Neurologist after his hospital stay at [facility] [place].  I did state that Neelima Peter NP and a Telehealth Neurologist did consult the patient at that time and he does have a follow up with [facility] in Jan. 2025.  Dr. Dumont ask if we could follow up with the patient sooner as he has had a seizure today.  I did let her know I would speak with [facility] tomorrow and we would reach out to the patient after that.  Dr. Dumont is in agreement.

## 2024-10-09 NOTE — DISCHARGE INSTRUCTIONS
Return or seek medical attention with persistent or prolonged seizure activity, or other concerns.  Make sure to get at least 8 hours of rest nightly.  Lack of sleep may increase your risk of seizures.  Keppra 750 mg twice daily has been prescribed pending follow-up with your neurologist.

## 2024-10-09 NOTE — ED PROVIDER NOTES
[facility] EMERGENCY DEPT  eMERGENCY dEPARTMENT eNCOUnter      Pt Name: Cole Duran  MRN: [id number]  Birthdate 1995  Date of evaluation: 10/8/2024  Provider: Lorene Quarles MD    CHIEF COMPLAINT       Chief Complaint   Patient presents with   • Seizures     PT has a hx of seizures, pt at [facility], policy to send pt out if seizure lasts more than 3 minutes. Seizure witnessed, pt assisted to the floor by staff. EMS states postictal when arrived. Pt alert now, just c/o being tired. PT has not missed any of his keppra.         HISTORY OF PRESENT ILLNESS   (Location/Symptom, Timing/Onset,Context/Setting, Quality, Duration, Modifying Factors, Severity)  Note limiting factors.   Cole Duran is a 29 y.o. male who presents to the emergency department ***     HPI    NursingNotes were reviewed.    REVIEW OF SYSTEMS    (2-9 systems for level 4, 10 or more for level 5)     Review of Systems         PAST MEDICALHISTORY     Past Medical History:   Diagnosis Date   • Seizures (HCC)          SURGICAL HISTORY     No past surgical history on file.      CURRENT MEDICATIONS     Previous Medications    LEVETIRACETAM (KEPPRA) 750 MG TABLET    Take 1 tablet by mouth every 12 hours       ALLERGIES     Patient has no known allergies.    FAMILY HISTORY     No family history on file.       SOCIAL HISTORY       Social History     Socioeconomic History   • Marital status: Single     Social Determinants of Health      Received from [facility]    Family and Community Support   Intimate Partner Violence: Not At Risk (9/28/2024)    Received from [facility]    Abuse Screen    • Feels Unsafe at Home or Work/School: no    • Feels Threatened by Someone: no    • Does Anyone Try to Keep You From Having Contact with Others or Doing Things Outside Your Home?: no    • Physical Signs of Abuse Present: no   Housing Stability: Unknown (9/28/2024)    Received from [facility]    Housing Stability    • Current  shortness of breath.    Cardiovascular:  Negative for chest pain and palpitations.   Gastrointestinal:  Negative for abdominal pain, nausea and vomiting.   Genitourinary:  Negative for difficulty urinating and dysuria.   Musculoskeletal:  Negative for back pain and neck pain.   Skin:  Negative for color change and pallor.   Neurological:  Positive for seizures. Negative for syncope, facial asymmetry, weakness, light-headedness and headaches.   Psychiatric/Behavioral:  Negative for agitation and decreased concentration.    All other systems reviewed and are negative.           PAST MEDICALHISTORY     Past Medical History:   Diagnosis Date    Seizures (HCC)          SURGICAL HISTORY     No past surgical history on file.      CURRENT MEDICATIONS     Previous Medications    LEVETIRACETAM (KEPPRA) 750 MG TABLET    Take 1 tablet by mouth every 12 hours       ALLERGIES     Patient has no known allergies.    FAMILY HISTORY     No family history on file.       SOCIAL HISTORY       Social History     Socioeconomic History    Marital status: Single     Social Determinants of Health      Received from [facility]    Family and Community Support   Intimate Partner Violence: Not At Risk (9/28/2024)    Received from [facility]    Abuse Screen     Feels Unsafe at Home or Work/School: no     Feels Threatened by Someone: no     Does Anyone Try to Keep You From Having Contact with Others or Doing Things Outside Your Home?: no     Physical Signs of Abuse Present: no   Housing Stability: Unknown (9/28/2024)    Received from [facility]    Housing Stability     Current Living Arrangements: group home       SCREENINGS    [place] Coma Scale  Eye Opening: Spontaneous  Best Verbal Response: Oriented  Best Motor Response: Obeys commands  [place] Coma Scale Score: 15        PHYSICAL EXAM    (up to 7 for level 4, 8 or more for level 5)     ED Triage Vitals [10/08/24 2015]   BP Systolic BP Percentile

## 2024-10-09 NOTE — ED PROVIDER NOTES
[facility] EMERGENCY DEPT  eMERGENCY dEPARTMENT eNCOUnter      Pt Name: Cole Duran  MRN: [id number]  Birthdate 1995  Date of evaluation: 10/9/2024  Provider: Lorene Quarles MD    CHIEF COMPLAINT       Chief Complaint   Patient presents with    Seizures     Pt brought in by EMS for reportedly having two seizures today, the second one lasting \"long enough pt started to turn blue\" according pt people pt lives with. Pt appears back to baseline at this time. Pt reports increase in number of seizures over the past week and has been seen at this hospital last night and at [facility] x1 week ago. Pt denies any changes being made to his Rx and denies missing any doses.          HISTORY OF PRESENT ILLNESS   (Location/Symptom, Timing/Onset,Context/Setting, Quality, Duration, Modifying Factors, Severity)  Note limiting factors.   Cole Duran is a 29 y.o. male who presents to the emergency department with seen seizures.  Patient has a history of seizure disorder since the age of 10 years old.  Patient states that he was \"in group at [facility] when he had 2 seizures today.  EMS states that the second seizure was associated with color change.  Patient had returned to baseline at the time the ambulance arrived.  Patient denies recent changes to medication but this examiner evaluated him last night.  Patient's medication was increased from Keppra 750 mg once daily to twice daily.  Patient was given an extra dose of Keppra at 750 last night.  Patient was monitored in the emergency department for 4 hours without further seizure activity.  Patient states that [facility] only gave him Keppra 500 mg this morning.  \"They only gave me 1 pill and not a pill and a half.\"  Patient does missing doses of his medications.  Patient was evaluated at [facility] by Dr. Ryan, neurologist.  Patient underwent an MRI on 9/28/2024 that showed increased signal in the splenic area of the corpus callosum which may be consistent with seizure

## 2024-10-09 NOTE — CARE COORDINATION
DOUG contacted Chari to see if he could be prescribed Dilantin for his seizures and have them be taken with him to the facility.     [facility] approved for him to have new medication and dosage and are aware he will be sent with it.     RN notified.

## 2024-10-10 LAB
EKG P AXIS: -69 DEGREES
EKG P-R INTERVAL: 122 MS
EKG Q-T INTERVAL: 322 MS
EKG QRS DURATION: 98 MS
EKG QTC CALCULATION (BAZETT): 360 MS
EKG T AXIS: 60 DEGREES

## 2024-10-10 PROCEDURE — 93010 ELECTROCARDIOGRAM REPORT: CPT | Performed by: INTERNAL MEDICINE

## 2024-10-10 NOTE — ED PROVIDER NOTES
[facility] EMERGENCY DEPT  eMERGENCY dEPARTMENT eNCOUnter      Pt Name: Cole Duran  MRN: [id number]  Birthdate 1995  Date of evaluation: 10/9/2024  Provider: Margarita Colón MD    CHIEF COMPLAINT       Chief Complaint   Patient presents with    Seizures         HISTORY OF PRESENT ILLNESS   (Location/Symptom, Timing/Onset,Context/Setting, Quality, Duration, Modifying Factors, Severity)  Note limiting factors.     HPI    Cole Duran is a 29 y.o. male with PMH of epilepsy who presents to the emergency department with CC of breakthrough seizure.  Reportedly witnessed by others at [facility] rehab facility, but no other report available.  Patient thinks seizure lasted a couple minutes and resolved on its own without intervention.  He is back to baseline upon arrival to the ED.  This is his third visit to the ED in 24 hours.  During the prior visit, there was extensive discussion with both his primary neurology group at [facility] and the neurologist here at [facility], and it was recommended that he discontinue Keppra and be started on Dilantin 3 times daily.  He admits that he missed his dose of Dilantin upon returning to [facility] this afternoon.        NursingNotes were reviewed.    REVIEW OF SYSTEMS    (2-9 systems for level 4, 10 or more for level 5)     Review of Systems   Constitutional:  Negative for appetite change, chills, fatigue and fever.   HENT:  Negative for congestion and sore throat.    Eyes:  Negative for pain and redness.   Respiratory:  Negative for cough, chest tightness and shortness of breath.    Cardiovascular:  Negative for chest pain and leg swelling.   Gastrointestinal:  Negative for abdominal pain, blood in stool, diarrhea, nausea and vomiting.   Genitourinary:  Negative for decreased urine volume, dysuria and frequency.   Musculoskeletal:  Negative for neck pain and neck stiffness.   Skin:  Negative for rash and wound.   Neurological:  Positive for seizures. Negative for dizziness,

## 2024-10-11 LAB — LEVETIRACETAM SERPL-MCNC: 23 UG/ML (ref 10–40)

## 2024-10-11 RX ORDER — LEVETIRACETAM 750 MG/1
750 TABLET, FILM COATED, EXTENDED RELEASE ORAL 2 TIMES DAILY
Qty: 60 TABLET | Refills: 3 | Status: SHIPPED | OUTPATIENT
Start: 2024-10-11

## 2024-10-11 NOTE — PROGRESS NOTES
Patient discharged on 9/29/2024 with Keppra 750 mg BID. Unfortunately, patient had more seizure like spells at the facility he is living on 10/8 and 10/9. He was taken to [facility] each time. On 10/9, Keppra was d/c and patient started on Dilantin 100 mg TID. Dr. Dumont at [facility] ED contacted neurology office with the update. I have reviewed the patient case with Dr. CHARLOTTE Dior and will start Keppra  mg BID. Patient to take BID for 2 days and then discontinue dilantin. I did discuss the above plan with Satcie Duggan at [phone]. I have sent the new prescription for Keppra XR to [facility] in [place], [place]. Stacie verbalized understanding and will notify the facility where Get is living.

## 2024-10-16 ASSESSMENT — ENCOUNTER SYMPTOMS
RHINORRHEA: 0
VOMITING: 0
BACK PAIN: 0
COUGH: 0
COLOR CHANGE: 0
ABDOMINAL PAIN: 0
SHORTNESS OF BREATH: 0
NAUSEA: 0

## 2024-10-17 LAB
QT INTERVAL: 326 MS
QTC INTERVAL: 385 MS

## 2024-11-12 ENCOUNTER — HOSPITAL ENCOUNTER (EMERGENCY)
Facility: HOSPITAL | Age: 29
Discharge: HOME | End: 2024-11-12
Attending: EMERGENCY MEDICINE | Admitting: EMERGENCY MEDICINE
Payer: MEDICAID

## 2024-11-12 ENCOUNTER — TELEPHONE (OUTPATIENT)
Dept: NEUROLOGY | Age: 29
End: 2024-11-12

## 2024-11-12 VITALS
BODY MASS INDEX: 23.84 KG/M2 | OXYGEN SATURATION: 100 % | RESPIRATION RATE: 16 BRPM | DIASTOLIC BLOOD PRESSURE: 89 MMHG | HEART RATE: 95 BPM | WEIGHT: 176 LBS | HEIGHT: 72 IN | SYSTOLIC BLOOD PRESSURE: 133 MMHG | TEMPERATURE: 97.8 F

## 2024-11-12 DIAGNOSIS — R56.9 SEIZURE: Primary | ICD-10-CM

## 2024-11-12 LAB
ALBUMIN SERPL-MCNC: 4.5 G/DL (ref 3.5–5.2)
ALBUMIN/GLOB SERPL: 1.5 G/DL
ALP SERPL-CCNC: 77 U/L (ref 39–117)
ALT SERPL W P-5'-P-CCNC: 13 U/L (ref 1–41)
AMPHET+METHAMPHET UR QL: NEGATIVE
AMPHETAMINES UR QL: NEGATIVE
ANION GAP SERPL CALCULATED.3IONS-SCNC: 8 MMOL/L (ref 5–15)
AST SERPL-CCNC: 19 U/L (ref 1–40)
BARBITURATES UR QL SCN: POSITIVE
BASOPHILS # BLD AUTO: 0.03 10*3/MM3 (ref 0–0.2)
BASOPHILS NFR BLD AUTO: 0.5 % (ref 0–1.5)
BENZODIAZ UR QL SCN: POSITIVE
BILIRUB SERPL-MCNC: 0.4 MG/DL (ref 0–1.2)
BILIRUB UR QL STRIP: NEGATIVE
BUN SERPL-MCNC: 12 MG/DL (ref 6–20)
BUN/CREAT SERPL: 14.8 (ref 7–25)
BUPRENORPHINE SERPL-MCNC: NEGATIVE NG/ML
CALCIUM SPEC-SCNC: 9.1 MG/DL (ref 8.6–10.5)
CANNABINOIDS SERPL QL: NEGATIVE
CHLORIDE SERPL-SCNC: 102 MMOL/L (ref 98–107)
CLARITY UR: CLEAR
CO2 SERPL-SCNC: 30 MMOL/L (ref 22–29)
COCAINE UR QL: NEGATIVE
COLOR UR: YELLOW
CREAT SERPL-MCNC: 0.81 MG/DL (ref 0.76–1.27)
DEPRECATED RDW RBC AUTO: 38.8 FL (ref 37–54)
EGFRCR SERPLBLD CKD-EPI 2021: 123.2 ML/MIN/1.73
EOSINOPHIL # BLD AUTO: 0.18 10*3/MM3 (ref 0–0.4)
EOSINOPHIL NFR BLD AUTO: 2.8 % (ref 0.3–6.2)
ERYTHROCYTE [DISTWIDTH] IN BLOOD BY AUTOMATED COUNT: 11.9 % (ref 12.3–15.4)
FENTANYL UR-MCNC: NEGATIVE NG/ML
GLOBULIN UR ELPH-MCNC: 3 GM/DL
GLUCOSE SERPL-MCNC: 84 MG/DL (ref 65–99)
GLUCOSE UR STRIP-MCNC: NEGATIVE MG/DL
HCT VFR BLD AUTO: 47.4 % (ref 37.5–51)
HGB BLD-MCNC: 16.1 G/DL (ref 13–17.7)
HGB UR QL STRIP.AUTO: NEGATIVE
HOLD SPECIMEN: NORMAL
IMM GRANULOCYTES # BLD AUTO: 0.01 10*3/MM3 (ref 0–0.05)
IMM GRANULOCYTES NFR BLD AUTO: 0.2 % (ref 0–0.5)
KETONES UR QL STRIP: NEGATIVE
LEUKOCYTE ESTERASE UR QL STRIP.AUTO: NEGATIVE
LYMPHOCYTES # BLD AUTO: 1.43 10*3/MM3 (ref 0.7–3.1)
LYMPHOCYTES NFR BLD AUTO: 22.3 % (ref 19.6–45.3)
MCH RBC QN AUTO: 30.2 PG (ref 26.6–33)
MCHC RBC AUTO-ENTMCNC: 34 G/DL (ref 31.5–35.7)
MCV RBC AUTO: 88.9 FL (ref 79–97)
METHADONE UR QL SCN: NEGATIVE
MONOCYTES # BLD AUTO: 0.8 10*3/MM3 (ref 0.1–0.9)
MONOCYTES NFR BLD AUTO: 12.5 % (ref 5–12)
NEUTROPHILS NFR BLD AUTO: 3.97 10*3/MM3 (ref 1.7–7)
NEUTROPHILS NFR BLD AUTO: 61.7 % (ref 42.7–76)
NITRITE UR QL STRIP: NEGATIVE
NRBC BLD AUTO-RTO: 0 /100 WBC (ref 0–0.2)
OPIATES UR QL: NEGATIVE
OXYCODONE UR QL SCN: NEGATIVE
PCP UR QL SCN: NEGATIVE
PH UR STRIP.AUTO: 5.5 [PH] (ref 5–8)
PHENYTOIN SERPL-MCNC: 16.7 MCG/ML (ref 10–20)
PLATELET # BLD AUTO: 221 10*3/MM3 (ref 140–450)
PMV BLD AUTO: 8.9 FL (ref 6–12)
POTASSIUM SERPL-SCNC: 3.9 MMOL/L (ref 3.5–5.2)
PROT SERPL-MCNC: 7.5 G/DL (ref 6–8.5)
PROT UR QL STRIP: NEGATIVE
RBC # BLD AUTO: 5.33 10*6/MM3 (ref 4.14–5.8)
SODIUM SERPL-SCNC: 140 MMOL/L (ref 136–145)
SP GR UR STRIP: 1.03 (ref 1–1.03)
TRICYCLICS UR QL SCN: NEGATIVE
UROBILINOGEN UR QL STRIP: NORMAL
WBC NRBC COR # BLD AUTO: 6.42 10*3/MM3 (ref 3.4–10.8)
WHOLE BLOOD HOLD COAG: NORMAL
WHOLE BLOOD HOLD SPECIMEN: NORMAL

## 2024-11-12 PROCEDURE — 81003 URINALYSIS AUTO W/O SCOPE: CPT | Performed by: EMERGENCY MEDICINE

## 2024-11-12 PROCEDURE — 80053 COMPREHEN METABOLIC PANEL: CPT | Performed by: EMERGENCY MEDICINE

## 2024-11-12 PROCEDURE — 80185 ASSAY OF PHENYTOIN TOTAL: CPT | Performed by: EMERGENCY MEDICINE

## 2024-11-12 PROCEDURE — 25010000002 LEVETRIRACETAM PER 10 MG: Performed by: EMERGENCY MEDICINE

## 2024-11-12 PROCEDURE — 85025 COMPLETE CBC W/AUTO DIFF WBC: CPT | Performed by: EMERGENCY MEDICINE

## 2024-11-12 PROCEDURE — 99283 EMERGENCY DEPT VISIT LOW MDM: CPT

## 2024-11-12 PROCEDURE — 80307 DRUG TEST PRSMV CHEM ANLYZR: CPT | Performed by: EMERGENCY MEDICINE

## 2024-11-12 PROCEDURE — 96374 THER/PROPH/DIAG INJ IV PUSH: CPT

## 2024-11-12 RX ORDER — LEVETIRACETAM 500 MG/5ML
1000 INJECTION, SOLUTION, CONCENTRATE INTRAVENOUS ONCE
Status: COMPLETED | OUTPATIENT
Start: 2024-11-12 | End: 2024-11-12

## 2024-11-12 RX ORDER — SODIUM CHLORIDE 0.9 % (FLUSH) 0.9 %
10 SYRINGE (ML) INJECTION AS NEEDED
Status: DISCONTINUED | OUTPATIENT
Start: 2024-11-12 | End: 2024-11-12 | Stop reason: HOSPADM

## 2024-11-12 RX ADMIN — LEVETIRACETAM 1000 MG: 100 INJECTION INTRAVENOUS at 16:42

## 2024-11-12 NOTE — TELEPHONE ENCOUNTER
[facility] called to schedule the ED follow up.  Pt was first seen at [facility] ED 09/30/24.  There have been several ED visit for seizures.

## 2024-11-12 NOTE — ED PROVIDER NOTES
Subjective   History of Present Illness  Pt presents to the  with report of seizures - states had 3 today.  Pt has hx of seizures and, per prior documents, has had issues with compliance and med changes recently.  Pt reportedly had seizure lasting up to 10-15min earlier today.  No injuries/recent illnesses. Pt has notations regarding different medications - he was on keppra and then this was changed to dilantin - pt had not been taking his dilantin for 5d but took dose today.  It is unclear which medication pt is supposed to be on at this point.  Has note from neuro recently with report that he was to be back on keppra.        Review of Systems   Constitutional:  Negative for chills and fever.   HENT:  Negative for congestion.    Respiratory:  Negative for cough and shortness of breath.    Cardiovascular:  Negative for chest pain.   Gastrointestinal:  Negative for abdominal pain, nausea and vomiting.   Neurological:  Positive for seizures. Negative for weakness and numbness.   All other systems reviewed and are negative.      Past Medical History:   Diagnosis Date    Hyperlipidemia     Seizures        No Known Allergies    Past Surgical History:   Procedure Laterality Date    NO PAST SURGERIES         History reviewed. No pertinent family history.    Social History     Socioeconomic History    Marital status: Single   Tobacco Use    Smoking status: Heavy Smoker     Current packs/day: 1.00     Types: Cigarettes   Vaping Use    Vaping status: Every Day    Substances: Nicotine    Passive vaping exposure: Yes   Substance and Sexual Activity    Alcohol use: No    Drug use: Not Currently     Types: Methamphetamines    Sexual activity: Defer           Objective   Physical Exam  Vitals and nursing note reviewed.   Constitutional:       General: He is not in acute distress.  HENT:      Head: Normocephalic and atraumatic.      Nose: Nose normal.      Mouth/Throat:      Mouth: Mucous membranes are moist.   Eyes:       Extraocular Movements: Extraocular movements intact.      Conjunctiva/sclera: Conjunctivae normal.      Pupils: Pupils are equal, round, and reactive to light.   Cardiovascular:      Rate and Rhythm: Normal rate and regular rhythm.      Pulses: Normal pulses.      Heart sounds: Normal heart sounds.   Pulmonary:      Effort: Pulmonary effort is normal.      Breath sounds: Normal breath sounds.   Abdominal:      General: Abdomen is flat. Bowel sounds are normal.      Palpations: Abdomen is soft.   Skin:     General: Skin is warm and dry.      Capillary Refill: Capillary refill takes less than 2 seconds.   Neurological:      General: No focal deficit present.      Mental Status: He is alert and oriented to person, place, and time.      Cranial Nerves: No cranial nerve deficit.      Sensory: No sensory deficit.      Motor: No weakness.      Coordination: Coordination normal.         Procedures           ED Course      Labs Reviewed   COMPREHENSIVE METABOLIC PANEL - Abnormal; Notable for the following components:       Result Value    CO2 30.0 (*)     All other components within normal limits    Narrative:     GFR Normal >60  Chronic Kidney Disease <60  Kidney Failure <15     URINE DRUG SCREEN - Abnormal; Notable for the following components:    Benzodiazepine Screen, Urine Positive (*)     Barbiturates Screen, Urine Positive (*)     All other components within normal limits    Narrative:     Cutoff For Drugs Screened:    Amphetamines               500 ng/ml  Barbiturates               200 ng/ml  Benzodiazepines            150 ng/ml  Cocaine                    150 ng/ml  Methadone                  200 ng/ml  Opiates                    100 ng/ml  Phencyclidine               25 ng/ml  THC                         50 ng/ml  Methamphetamine            500 ng/ml  Tricyclic Antidepressants  300 ng/ml  Oxycodone                  100 ng/ml  Buprenorphine               10 ng/ml    The normal value for all drugs tested is negative.  This report includes unconfirmed screening results, with the cutoff values listed, to be used for medical treatment purposes only.  Unconfirmed results must not be used for non-medical purposes such as employment or legal testing.  Clinical consideration should be applied to any drug of abuse test, particularly when unconfirmed results are used.     CBC WITH AUTO DIFFERENTIAL - Abnormal; Notable for the following components:    RDW 11.9 (*)     Monocyte % 12.5 (*)     All other components within normal limits   URINALYSIS W/ MICROSCOPIC IF INDICATED (NO CULTURE) - Normal    Narrative:     Urine microscopic not indicated.   PHENYTOIN LEVEL, TOTAL - Normal   FENTANYL, URINE - Normal    Narrative:     Negative Threshold:      Fentanyl 5 ng/mL     The normal value for the drug tested is negative. This report includes final unconfirmed screening results to be used for medical treatment purposes only. Unconfirmed results must not be used for non-medical purposes such as employment or legal testing. Clinical consideration should be applied to any drug of abuse test, particularly when unconfirmed results are used.          RAINBOW DRAW    Narrative:     The following orders were created for panel order [place] Draw.  Procedure                               Abnormality         Status                     ---------                               -----------         ------                     Green Top (Gel)[[id number]]                                  Final result               Lavender Top[[id number]]                                     Final result               Red Top[[id number]]                                          Final result               Perez Top[[id number]]                                         Final result               Light Blue Top[[id number]]                                   Final result                 Please view results for these tests on the individual orders.   GREEN TOP   LAVENDER TOP   RED TOP   GRAY TOP    LIGHT BLUE TOP   CBC AND DIFFERENTIAL    Narrative:     The following orders were created for panel order CBC & Differential.  Procedure                               Abnormality         Status                     ---------                               -----------         ------                     CBC Auto Differential[[id number]]        Abnormal            Final result                 Please view results for these tests on the individual orders.                     No data recorded                             Medical Decision Making  Pt stable in EC - no neuro deficits and no seizure activity in EC.  Pt with hx of non-compliance and reports he hasn't been taking his medications.  His tox screen is positive for benzos and barbiturates.  It is unclear where the barbiturates have come in- unclear if he has been taking this illicitly.  D/w Dr. Kent - he recommends encouraging compliance with medication and outpt f/u with neuro.  Pt given 1g IV keppra and will d/c.  Has rx for keppra and dilantin from recent visit to [facility] - informed that he can take these two and given neuro info for f/u    Amount and/or Complexity of Data Reviewed  Labs: ordered.    Risk  Prescription drug management.        Final diagnoses:   Seizure       ED Disposition  ED Disposition       ED Disposition   Discharge    Condition   Stable    Comment   --               Provider, No Known  [facility] [id number]  [phone]               Medication List      No changes were made to your prescriptions during this visit.            Alexis Burroughs, DO  11/12/24 1530       Alexis Burroughs, DO  11/12/24 [id number]

## 2025-01-13 ENCOUNTER — TELEPHONE (OUTPATIENT)
Dept: NEUROLOGY | Facility: CLINIC | Age: 30
End: 2025-01-13
Payer: MEDICAID

## 2025-01-13 NOTE — TELEPHONE ENCOUNTER
I called patient to let him know of his appt change. I was unable to reach him and no voicemail. I have mailed patient an appt reminder of his new [facility]fu appt.    Ok for hub to relay message.   Olanzapine Pregnancy And Lactation Text: This medication is pregnancy category C.   There are no adequate and well controlled trials with olanzapine in pregnant females.  Olanzapine should be used during pregnancy only if the potential benefit justifies the potential risk to the fetus.   In a study in lactating healthy women, olanzapine was excreted in breast milk.  It is recommended that women taking olanzapine should not breast feed.